# Patient Record
Sex: MALE | Race: WHITE
[De-identification: names, ages, dates, MRNs, and addresses within clinical notes are randomized per-mention and may not be internally consistent; named-entity substitution may affect disease eponyms.]

---

## 2018-01-22 RX ADMIN — CEFAZOLIN SODIUM 1 MLS/HR: 2 SOLUTION INTRAVENOUS at 07:50

## 2018-01-23 ENCOUNTER — HOSPITAL ENCOUNTER (INPATIENT)
Age: 63
LOS: 9 days | Discharge: NURSING, REHAB OR LONG-TERM CARE | DRG: 519 | End: 2018-02-01

## 2018-01-23 ENCOUNTER — HOSPITAL ENCOUNTER (INPATIENT)
Dept: HOSPITAL 91 - REC | Age: 63
LOS: 9 days | Discharge: NURSING, REHAB OR LONG-TERM CARE | DRG: 519 | End: 2018-02-01
Payer: COMMERCIAL

## 2018-01-23 DIAGNOSIS — Z87.891: ICD-10-CM

## 2018-01-23 DIAGNOSIS — M48.061: Primary | ICD-10-CM

## 2018-01-23 DIAGNOSIS — G47.30: ICD-10-CM

## 2018-01-23 DIAGNOSIS — Z79.01: ICD-10-CM

## 2018-01-23 DIAGNOSIS — I10: ICD-10-CM

## 2018-01-23 DIAGNOSIS — R20.8: ICD-10-CM

## 2018-01-23 DIAGNOSIS — Z85.01: ICD-10-CM

## 2018-01-23 DIAGNOSIS — E87.1: ICD-10-CM

## 2018-01-23 DIAGNOSIS — K21.9: ICD-10-CM

## 2018-01-23 DIAGNOSIS — F11.20: ICD-10-CM

## 2018-01-23 DIAGNOSIS — G89.18: ICD-10-CM

## 2018-01-23 DIAGNOSIS — M79.661: ICD-10-CM

## 2018-01-23 DIAGNOSIS — M51.16: ICD-10-CM

## 2018-01-23 DIAGNOSIS — R00.1: ICD-10-CM

## 2018-01-23 DIAGNOSIS — I48.91: ICD-10-CM

## 2018-01-23 DIAGNOSIS — R33.9: ICD-10-CM

## 2018-01-23 DIAGNOSIS — M79.662: ICD-10-CM

## 2018-01-23 DIAGNOSIS — I49.3: ICD-10-CM

## 2018-01-23 DIAGNOSIS — E83.42: ICD-10-CM

## 2018-01-23 LAB
ANION GAP: 13 (ref 8–16)
BLOOD UREA NITROGEN: 24 MG/DL (ref 7–20)
CALCIUM: 9.1 MG/DL (ref 8.4–10.2)
CARBON DIOXIDE: 30 MMOL/L (ref 21–31)
CHLORIDE: 102 MMOL/L (ref 97–110)
CREATININE: 1.1 MG/DL (ref 0.61–1.24)
GLUCOSE: 90 MG/DL (ref 70–220)
HEPATITIS B SURFACE ANTIBODY: NEGATIVE
HEPATITIS B SURFACE ANTIGEN: NEGATIVE
HEPATITIS C VIRAL ANTIBODY: NEGATIVE
HIV 1&2 ANTIBODY: NEGATIVE
POTASSIUM: 4.1 MMOL/L (ref 3.5–5.1)
SODIUM: 141 MMOL/L (ref 135–144)

## 2018-01-23 PROCEDURE — 83540 ASSAY OF IRON: CPT

## 2018-01-23 PROCEDURE — 84443 ASSAY THYROID STIM HORMONE: CPT

## 2018-01-23 PROCEDURE — 88304 TISSUE EXAM BY PATHOLOGIST: CPT

## 2018-01-23 PROCEDURE — 81001 URINALYSIS AUTO W/SCOPE: CPT

## 2018-01-23 PROCEDURE — 93005 ELECTROCARDIOGRAM TRACING: CPT

## 2018-01-23 PROCEDURE — 93970 EXTREMITY STUDY: CPT

## 2018-01-23 PROCEDURE — 83735 ASSAY OF MAGNESIUM: CPT

## 2018-01-23 PROCEDURE — 82040 ASSAY OF SERUM ALBUMIN: CPT

## 2018-01-23 PROCEDURE — 84100 ASSAY OF PHOSPHORUS: CPT

## 2018-01-23 PROCEDURE — 80048 BASIC METABOLIC PNL TOTAL CA: CPT

## 2018-01-23 PROCEDURE — 4A11X4G MONITORING OF PERIPHERAL NERVOUS ELECTRICAL ACTIVITY, INTRAOPERATIVE, EXTERNAL APPROACH: ICD-10-PCS

## 2018-01-23 PROCEDURE — 86803 HEPATITIS C AB TEST: CPT

## 2018-01-23 PROCEDURE — 0SB20ZZ EXCISION OF LUMBAR VERTEBRAL DISC, OPEN APPROACH: ICD-10-PCS

## 2018-01-23 PROCEDURE — 88311 DECALCIFY TISSUE: CPT

## 2018-01-23 PROCEDURE — 86703 HIV-1/HIV-2 1 RESULT ANTBDY: CPT

## 2018-01-23 PROCEDURE — 01NB0ZZ RELEASE LUMBAR NERVE, OPEN APPROACH: ICD-10-PCS

## 2018-01-23 PROCEDURE — 87340 HEPATITIS B SURFACE AG IA: CPT

## 2018-01-23 PROCEDURE — 97164 PT RE-EVAL EST PLAN CARE: CPT

## 2018-01-23 PROCEDURE — 97163 PT EVAL HIGH COMPLEX 45 MIN: CPT

## 2018-01-23 PROCEDURE — 86999 UNLISTED TRANSFUSION MED PX: CPT

## 2018-01-23 PROCEDURE — 97530 THERAPEUTIC ACTIVITIES: CPT

## 2018-01-23 PROCEDURE — 97116 GAIT TRAINING THERAPY: CPT

## 2018-01-23 PROCEDURE — 72131 CT LUMBAR SPINE W/O DYE: CPT

## 2018-01-23 PROCEDURE — 86706 HEP B SURFACE ANTIBODY: CPT

## 2018-01-23 PROCEDURE — 82728 ASSAY OF FERRITIN: CPT

## 2018-01-23 PROCEDURE — 87086 URINE CULTURE/COLONY COUNT: CPT

## 2018-01-23 PROCEDURE — 85025 COMPLETE CBC W/AUTO DIFF WBC: CPT

## 2018-01-23 PROCEDURE — 87081 CULTURE SCREEN ONLY: CPT

## 2018-01-23 PROCEDURE — 72020 X-RAY EXAM OF SPINE 1 VIEW: CPT

## 2018-01-23 RX ADMIN — THROMBIN TOPICAL RECOMBINANT 1 UNITS: KIT at 09:09

## 2018-01-23 RX ADMIN — DEXTROSE, SODIUM CHLORIDE, AND POTASSIUM CHLORIDE 1 MLS/HR: 5; .45; .15 INJECTION INTRAVENOUS at 17:36

## 2018-01-23 RX ADMIN — OXYCODONE HYDROCHLORIDE AND ACETAMINOPHEN 1 TAB: 10; 325 TABLET ORAL at 16:30

## 2018-01-23 RX ADMIN — SODIUM CITRATE AND CITRIC ACID MONOHYDRATE 1 ML: 500; 334 SOLUTION ORAL at 07:18

## 2018-01-23 RX ADMIN — DEXTROSE, SODIUM CHLORIDE, AND POTASSIUM CHLORIDE 1 MLS/HR: 5; .45; .15 INJECTION INTRAVENOUS at 16:31

## 2018-01-23 RX ADMIN — METOPROLOL TARTRATE 1 MG: 50 TABLET, FILM COATED ORAL at 20:51

## 2018-01-23 RX ADMIN — CEFAZOLIN 1 MLS/HR: 1 INJECTION, POWDER, FOR SOLUTION INTRAMUSCULAR; INTRAVENOUS at 22:58

## 2018-01-23 RX ADMIN — METOPROLOL TARTRATE 1 MG: 50 TABLET, FILM COATED ORAL at 15:54

## 2018-01-23 RX ADMIN — DOCUSATE SODIUM 1 MG: 100 CAPSULE, LIQUID FILLED ORAL at 09:00

## 2018-01-23 RX ADMIN — CEFAZOLIN 1 MLS/HR: 1 INJECTION, POWDER, FOR SOLUTION INTRAMUSCULAR; INTRAVENOUS at 16:30

## 2018-01-23 RX ADMIN — DOCUSATE SODIUM 1 MG: 100 CAPSULE, LIQUID FILLED ORAL at 20:50

## 2018-01-23 RX ADMIN — CEFAZOLIN SODIUM 1 MLS/HR: 2 SOLUTION INTRAVENOUS at 07:50

## 2018-01-23 RX ADMIN — OXYCODONE HYDROCHLORIDE AND ACETAMINOPHEN 1 TAB: 10; 325 TABLET ORAL at 22:02

## 2018-01-23 RX ADMIN — FENTANYL CITRATE 1 MCG: 50 INJECTION, SOLUTION INTRAMUSCULAR; INTRAVENOUS at 13:24

## 2018-01-23 RX ADMIN — PYRIDOXINE HYDROCHLORIDE 1 ML: 100 INJECTION, SOLUTION INTRAMUSCULAR; INTRAVENOUS at 07:00

## 2018-01-23 RX ADMIN — CALCIUM CHLORIDE 1 MG: 100 INJECTION INTRAVENOUS; INTRAVENTRICULAR at 09:14

## 2018-01-23 RX ADMIN — CEFAZOLIN 1 MLS/HR: 1 INJECTION, POWDER, FOR SOLUTION INTRAMUSCULAR; INTRAVENOUS at 11:51

## 2018-01-23 RX ADMIN — Medication 1 GM: at 09:11

## 2018-01-23 RX ADMIN — FENTANYL CITRATE 1 MCG: 50 INJECTION, SOLUTION INTRAMUSCULAR; INTRAVENOUS at 13:10

## 2018-01-23 RX ADMIN — BUPIVACAINE HYDROCHLORIDE AND EPINEPHRINE BITARTRATE 1 ML: 5; .005 INJECTION, SOLUTION EPIDURAL; INTRACAUDAL; PERINEURAL at 09:10

## 2018-01-23 RX ADMIN — Medication 1 MG: at 11:33

## 2018-01-23 RX ADMIN — ERGOCALCIFEROL 1 UNIT: 1.25 CAPSULE ORAL at 20:51

## 2018-01-23 RX ADMIN — CEFAZOLIN 1 GM: 1 INJECTION, POWDER, FOR SOLUTION INTRAMUSCULAR; INTRAVENOUS at 09:11

## 2018-01-23 RX ADMIN — BUPIVACAINE HYDROCHLORIDE 1 ML: 2.5 INJECTION, SOLUTION EPIDURAL; INFILTRATION; INTRACAUDAL; PERINEURAL at 10:16

## 2018-01-23 RX ADMIN — Medication 1 MG: at 16:29

## 2018-01-23 RX ADMIN — FENTANYL CITRATE 1 MCG: 50 INJECTION, SOLUTION INTRAMUSCULAR; INTRAVENOUS at 10:14

## 2018-01-23 RX ADMIN — GABAPENTIN 1 MG: 300 CAPSULE ORAL at 20:50

## 2018-01-24 LAB
ABNORMAL IP MESSAGE: 1
ADD MAN DIFF?: NO
ANION GAP: 9 (ref 8–16)
BASOPHIL #: 0 10^3/UL (ref 0–0.1)
BASOPHILS %: 0.2 % (ref 0–2)
BLOOD UREA NITROGEN: 19 MG/DL (ref 7–20)
CALCIUM: 9 MG/DL (ref 8.4–10.2)
CARBON DIOXIDE: 32 MMOL/L (ref 21–31)
CHLORIDE: 102 MMOL/L (ref 97–110)
CREATININE: 0.99 MG/DL (ref 0.61–1.24)
EOSINOPHILS #: 0 10^3/UL (ref 0–0.5)
EOSINOPHILS %: 0.1 % (ref 0–7)
GLUCOSE: 162 MG/DL (ref 70–220)
HEMATOCRIT: 32.6 % (ref 42–52)
HEMOGLOBIN: 9.9 G/DL (ref 14–18)
LYMPHOCYTES #: 1.4 10^3/UL (ref 0.8–2.9)
LYMPHOCYTES %: 14 % (ref 15–51)
MAGNESIUM: 1.7 MG/DL (ref 1.7–2.5)
MEAN CORPUSCULAR HEMOGLOBIN: 21.1 PG (ref 29–33)
MEAN CORPUSCULAR HGB CONC: 30.4 G/DL (ref 32–37)
MEAN CORPUSCULAR VOLUME: 69.4 FL (ref 82–101)
MONOCYTE #: 1.1 10^3/UL (ref 0.3–0.9)
MONOCYTES %: 11.3 % (ref 0–11)
NEUTROPHIL #: 7.3 10^3/UL (ref 1.6–7.5)
NEUTROPHILS %: 74 % (ref 39–77)
NUCLEATED RED BLOOD CELLS #: 0 10^3/UL (ref 0–0)
NUCLEATED RED BLOOD CELLS%: 0 /100WBC (ref 0–0)
PLATELET COUNT: 165 10^3/UL (ref 140–415)
POSITIVE DIFF: (no result)
POTASSIUM: 4.2 MMOL/L (ref 3.5–5.1)
RED BLOOD COUNT: 4.7 10^6/UL (ref 4.7–6.1)
RED CELL DISTRIBUTION WIDTH: 22.7 % (ref 11.5–14.5)
SODIUM: 139 MMOL/L (ref 135–144)
WHITE BLOOD COUNT: 9.8 10^3/UL (ref 4.8–10.8)

## 2018-01-24 RX ADMIN — DOCUSATE SODIUM 1 MG: 100 CAPSULE, LIQUID FILLED ORAL at 09:18

## 2018-01-24 RX ADMIN — Medication 1 MG: at 22:26

## 2018-01-24 RX ADMIN — Medication 1 MG: at 00:30

## 2018-01-24 RX ADMIN — Medication 1 MG: at 17:50

## 2018-01-24 RX ADMIN — OXYCODONE HYDROCHLORIDE AND ACETAMINOPHEN 1 TAB: 10; 325 TABLET ORAL at 09:19

## 2018-01-24 RX ADMIN — PYRIDOXINE HYDROCHLORIDE 1 ML: 100 INJECTION, SOLUTION INTRAMUSCULAR; INTRAVENOUS at 07:00

## 2018-01-24 RX ADMIN — HYDROMORPHONE HYDROCHLORIDE 1 MG: 1 INJECTION, SOLUTION INTRAMUSCULAR; INTRAVENOUS; SUBCUTANEOUS at 05:50

## 2018-01-24 RX ADMIN — DEXTROSE, SODIUM CHLORIDE, AND POTASSIUM CHLORIDE 1 MLS/HR: 5; .45; .15 INJECTION INTRAVENOUS at 16:27

## 2018-01-24 RX ADMIN — AMLODIPINE BESYLATE 1 MG: 2.5 TABLET ORAL at 09:20

## 2018-01-24 RX ADMIN — FENTANYL 1 PATCH: 12 PATCH TRANSDERMAL at 14:11

## 2018-01-24 RX ADMIN — GABAPENTIN 1 MG: 300 CAPSULE ORAL at 20:59

## 2018-01-24 RX ADMIN — DOCUSATE SODIUM 1 MG: 100 CAPSULE, LIQUID FILLED ORAL at 20:59

## 2018-01-24 RX ADMIN — Medication 1 MG: at 12:57

## 2018-01-24 RX ADMIN — CYCLOBENZAPRINE 1 MG: 10 TABLET, FILM COATED ORAL at 20:06

## 2018-01-24 RX ADMIN — AMLODIPINE BESYLATE 1 MG: 2.5 TABLET ORAL at 21:00

## 2018-01-24 RX ADMIN — CYANOCOBALAMIN TAB 500 MCG 1 MCG: 500 TAB at 09:19

## 2018-01-24 RX ADMIN — METOPROLOL TARTRATE 1 MG: 50 TABLET, FILM COATED ORAL at 08:23

## 2018-01-24 RX ADMIN — POLYETHYLENE GLYCOL 3350 1 GM: 17 POWDER, FOR SOLUTION ORAL at 09:15

## 2018-01-24 RX ADMIN — GABAPENTIN 1 MG: 300 CAPSULE ORAL at 09:18

## 2018-01-24 RX ADMIN — DEXTROSE, SODIUM CHLORIDE, AND POTASSIUM CHLORIDE 1 MLS/HR: 5; .45; .15 INJECTION INTRAVENOUS at 03:53

## 2018-01-24 RX ADMIN — METOPROLOL TARTRATE 1 MG: 50 TABLET, FILM COATED ORAL at 21:00

## 2018-01-24 RX ADMIN — DEXTROSE, SODIUM CHLORIDE, AND POTASSIUM CHLORIDE 1 MLS/HR: 5; .45; .15 INJECTION INTRAVENOUS at 12:58

## 2018-01-24 RX ADMIN — OXYCODONE HYDROCHLORIDE AND ACETAMINOPHEN 1 TAB: 10; 325 TABLET ORAL at 02:01

## 2018-01-25 LAB
ABNORMAL IP MESSAGE: 1
ADD MAN DIFF?: NO
ANION GAP: 15 (ref 8–16)
BASOPHIL #: 0 10^3/UL (ref 0–0.1)
BASOPHILS %: 0.2 % (ref 0–2)
BLOOD UREA NITROGEN: 11 MG/DL (ref 7–20)
CALCIUM: 8.7 MG/DL (ref 8.4–10.2)
CARBON DIOXIDE: 30 MMOL/L (ref 21–31)
CHLORIDE: 101 MMOL/L (ref 97–110)
CREATININE: 0.89 MG/DL (ref 0.61–1.24)
EOSINOPHILS #: 0.1 10^3/UL (ref 0–0.5)
EOSINOPHILS %: 0.7 % (ref 0–7)
GLUCOSE: 103 MG/DL (ref 70–220)
HEMATOCRIT: 33.8 % (ref 42–52)
HEMOGLOBIN: 10.2 G/DL (ref 14–18)
LYMPHOCYTES #: 1.7 10^3/UL (ref 0.8–2.9)
LYMPHOCYTES %: 20.3 % (ref 15–51)
MAGNESIUM: 1.6 MG/DL (ref 1.7–2.5)
MEAN CORPUSCULAR HEMOGLOBIN: 21.3 PG (ref 29–33)
MEAN CORPUSCULAR HGB CONC: 30.2 G/DL (ref 32–37)
MEAN CORPUSCULAR VOLUME: 70.4 FL (ref 82–101)
MONOCYTE #: 1.1 10^3/UL (ref 0.3–0.9)
MONOCYTES %: 12.7 % (ref 0–11)
NEUTROPHIL #: 5.6 10^3/UL (ref 1.6–7.5)
NEUTROPHILS %: 66 % (ref 39–77)
NUCLEATED RED BLOOD CELLS #: 0 10^3/UL (ref 0–0)
NUCLEATED RED BLOOD CELLS%: 0 /100WBC (ref 0–0)
PHOSPHORUS: 4.6 MG/DL (ref 2.5–4.9)
PLATELET COUNT: 153 10^3/UL (ref 140–415)
POSITIVE DIFF: (no result)
POTASSIUM: 4.6 MMOL/L (ref 3.5–5.1)
RED BLOOD COUNT: 4.8 10^6/UL (ref 4.7–6.1)
RED CELL DISTRIBUTION WIDTH: 23.3 % (ref 11.5–14.5)
SODIUM: 141 MMOL/L (ref 135–144)
WHITE BLOOD COUNT: 8.4 10^3/UL (ref 4.8–10.8)

## 2018-01-25 RX ADMIN — MAGNESIUM SULFATE HEPTAHYDRATE 1 MLS/HR: 500 INJECTION, SOLUTION INTRAMUSCULAR; INTRAVENOUS at 10:07

## 2018-01-25 RX ADMIN — Medication 1 MG: at 08:26

## 2018-01-25 RX ADMIN — DOCUSATE SODIUM 1 MG: 100 CAPSULE, LIQUID FILLED ORAL at 20:46

## 2018-01-25 RX ADMIN — GABAPENTIN 1 MG: 300 CAPSULE ORAL at 10:09

## 2018-01-25 RX ADMIN — CYANOCOBALAMIN TAB 500 MCG 1 MCG: 500 TAB at 10:09

## 2018-01-25 RX ADMIN — PYRIDOXINE HYDROCHLORIDE 1 ML: 100 INJECTION, SOLUTION INTRAMUSCULAR; INTRAVENOUS at 07:00

## 2018-01-25 RX ADMIN — DEXTROSE, SODIUM CHLORIDE, AND POTASSIUM CHLORIDE 1 MLS/HR: 5; .45; .15 INJECTION INTRAVENOUS at 18:55

## 2018-01-25 RX ADMIN — DEXTROSE, SODIUM CHLORIDE, AND POTASSIUM CHLORIDE 1 MLS/HR: 5; .45; .15 INJECTION INTRAVENOUS at 03:16

## 2018-01-25 RX ADMIN — CYCLOBENZAPRINE 1 MG: 10 TABLET, FILM COATED ORAL at 17:17

## 2018-01-25 RX ADMIN — GABAPENTIN 1 MG: 300 CAPSULE ORAL at 20:45

## 2018-01-25 RX ADMIN — Medication 1 MG: at 16:24

## 2018-01-25 RX ADMIN — DOCUSATE SODIUM 1 MG: 100 CAPSULE, LIQUID FILLED ORAL at 10:09

## 2018-01-25 RX ADMIN — POLYETHYLENE GLYCOL 3350 1 GM: 17 POWDER, FOR SOLUTION ORAL at 10:11

## 2018-01-25 RX ADMIN — METOPROLOL TARTRATE 1 MG: 50 TABLET, FILM COATED ORAL at 20:46

## 2018-01-25 RX ADMIN — HYDROMORPHONE HYDROCHLORIDE 1 MG: 1 INJECTION, SOLUTION INTRAMUSCULAR; INTRAVENOUS; SUBCUTANEOUS at 08:16

## 2018-01-25 RX ADMIN — CYCLOBENZAPRINE 1 MG: 10 TABLET, FILM COATED ORAL at 08:15

## 2018-01-25 RX ADMIN — METOPROLOL TARTRATE 1 MG: 50 TABLET, FILM COATED ORAL at 10:11

## 2018-01-25 RX ADMIN — AMLODIPINE BESYLATE 1 MG: 2.5 TABLET ORAL at 20:45

## 2018-01-25 RX ADMIN — Medication 1 MG: at 22:30

## 2018-01-25 RX ADMIN — AMLODIPINE BESYLATE 1 MG: 2.5 TABLET ORAL at 10:11

## 2018-01-26 LAB
% IRON SATURATION: 6 % SAT (ref 22–52)
ABNORMAL IP MESSAGE: 1
ADD MAN DIFF?: NO
ANION GAP: 14 (ref 8–16)
BASOPHIL #: 0 10^3/UL (ref 0–0.1)
BASOPHILS %: 0.2 % (ref 0–2)
BLOOD UREA NITROGEN: 11 MG/DL (ref 7–20)
CALCIUM: 9.7 MG/DL (ref 8.4–10.2)
CARBON DIOXIDE: 29 MMOL/L (ref 21–31)
CHLORIDE: 96 MMOL/L (ref 97–110)
CREATININE: 0.84 MG/DL (ref 0.61–1.24)
EOSINOPHILS #: 0.1 10^3/UL (ref 0–0.5)
EOSINOPHILS %: 0.8 % (ref 0–7)
FERRITIN: 54.8 NG/ML (ref 11.1–264)
GLUCOSE: 117 MG/DL (ref 70–220)
HEMATOCRIT: 35.3 % (ref 42–52)
HEMOGLOBIN: 11 G/DL (ref 14–18)
IRON: 23 UG/DL (ref 35–150)
LYMPHOCYTES #: 1.6 10^3/UL (ref 0.8–2.9)
LYMPHOCYTES %: 15.2 % (ref 15–51)
MAGNESIUM: 1.8 MG/DL (ref 1.7–2.5)
MEAN CORPUSCULAR HEMOGLOBIN: 21.4 PG (ref 29–33)
MEAN CORPUSCULAR HGB CONC: 31.2 G/DL (ref 32–37)
MEAN CORPUSCULAR VOLUME: 68.5 FL (ref 82–101)
MONOCYTE #: 1.3 10^3/UL (ref 0.3–0.9)
MONOCYTES %: 12.9 % (ref 0–11)
NEUTROPHIL #: 7.4 10^3/UL (ref 1.6–7.5)
NEUTROPHILS %: 70.4 % (ref 39–77)
NUCLEATED RED BLOOD CELLS #: 0 10^3/UL (ref 0–0)
NUCLEATED RED BLOOD CELLS%: 0 /100WBC (ref 0–0)
PHOSPHORUS: 3.8 MG/DL (ref 2.5–4.9)
PLATELET COUNT: 161 10^3/UL (ref 140–415)
POSITIVE DIFF: (no result)
POTASSIUM: 4.2 MMOL/L (ref 3.5–5.1)
RED BLOOD COUNT: 5.15 10^6/UL (ref 4.7–6.1)
RED CELL DISTRIBUTION WIDTH: 22.9 % (ref 11.5–14.5)
SODIUM: 135 MMOL/L (ref 135–144)
TOTAL IRON BINDING CAPACITY: 373 UG/DL (ref 241–421)
WHITE BLOOD COUNT: 10.4 10^3/UL (ref 4.8–10.8)

## 2018-01-26 RX ADMIN — LORAZEPAM 1 MG: 2 INJECTION, SOLUTION INTRAMUSCULAR; INTRAVENOUS at 16:00

## 2018-01-26 RX ADMIN — Medication 1 MG: at 09:39

## 2018-01-26 RX ADMIN — OXYCODONE HYDROCHLORIDE 1 MG: 20 TABLET, FILM COATED, EXTENDED RELEASE ORAL at 15:00

## 2018-01-26 RX ADMIN — DOCUSATE SODIUM 1 MG: 100 CAPSULE, LIQUID FILLED ORAL at 08:51

## 2018-01-26 RX ADMIN — Medication 1 MG: at 04:59

## 2018-01-26 RX ADMIN — METOPROLOL TARTRATE 1 MG: 50 TABLET, FILM COATED ORAL at 20:30

## 2018-01-26 RX ADMIN — Medication 1 MG: at 15:09

## 2018-01-26 RX ADMIN — THIAMINE HYDROCHLORIDE 1 MLS/HR: 100 INJECTION, SOLUTION INTRAMUSCULAR; INTRAVENOUS at 15:00

## 2018-01-26 RX ADMIN — METOPROLOL TARTRATE 1 MG: 50 TABLET, FILM COATED ORAL at 08:43

## 2018-01-26 RX ADMIN — AMLODIPINE BESYLATE 1 MG: 2.5 TABLET ORAL at 08:45

## 2018-01-26 RX ADMIN — GABAPENTIN 1 MG: 400 CAPSULE ORAL at 20:29

## 2018-01-26 RX ADMIN — DOCUSATE SODIUM 1 MG: 100 CAPSULE, LIQUID FILLED ORAL at 20:30

## 2018-01-26 RX ADMIN — GABAPENTIN 1 MG: 400 CAPSULE ORAL at 12:34

## 2018-01-26 RX ADMIN — AMLODIPINE BESYLATE 1 MG: 2.5 TABLET ORAL at 20:30

## 2018-01-26 RX ADMIN — SODIUM FERRIC GLUCONATE COMPLEX 1 MLS/HR: 12.5 INJECTION INTRAVENOUS at 14:59

## 2018-01-26 RX ADMIN — OXYCODONE HYDROCHLORIDE 1 MG: 20 TABLET, FILM COATED, EXTENDED RELEASE ORAL at 21:26

## 2018-01-26 RX ADMIN — CYANOCOBALAMIN TAB 500 MCG 1 MCG: 500 TAB at 08:51

## 2018-01-26 RX ADMIN — PYRIDOXINE HYDROCHLORIDE 1 ML: 100 INJECTION, SOLUTION INTRAMUSCULAR; INTRAVENOUS at 07:00

## 2018-01-26 RX ADMIN — POLYETHYLENE GLYCOL 3350 1 GM: 17 POWDER, FOR SOLUTION ORAL at 08:51

## 2018-01-26 RX ADMIN — DEXTROSE, SODIUM CHLORIDE, AND POTASSIUM CHLORIDE 1 MLS/HR: 5; .45; .15 INJECTION INTRAVENOUS at 04:55

## 2018-01-26 RX ADMIN — GABAPENTIN 1 MG: 300 CAPSULE ORAL at 08:51

## 2018-01-27 LAB
ABNORMAL IP MESSAGE: 1
ADD MAN DIFF?: NO
ADD UMIC: YES
ANION GAP: 12 (ref 8–16)
BASOPHIL #: 0 10^3/UL (ref 0–0.1)
BASOPHILS %: 0.2 % (ref 0–2)
BLOOD UREA NITROGEN: 16 MG/DL (ref 7–20)
CALCIUM: 9.4 MG/DL (ref 8.4–10.2)
CARBON DIOXIDE: 31 MMOL/L (ref 21–31)
CHLORIDE: 95 MMOL/L (ref 97–110)
CREATININE: 1.02 MG/DL (ref 0.61–1.24)
EOSINOPHILS #: 0.2 10^3/UL (ref 0–0.5)
EOSINOPHILS %: 1.7 % (ref 0–7)
GLUCOSE: 109 MG/DL (ref 70–220)
HEMATOCRIT: 35.2 % (ref 42–52)
HEMOGLOBIN: 10.7 G/DL (ref 14–18)
LYMPHOCYTES #: 1.4 10^3/UL (ref 0.8–2.9)
LYMPHOCYTES %: 16.1 % (ref 15–51)
MAGNESIUM: 1.7 MG/DL (ref 1.7–2.5)
MEAN CORPUSCULAR HEMOGLOBIN: 21.1 PG (ref 29–33)
MEAN CORPUSCULAR HGB CONC: 30.4 G/DL (ref 32–37)
MEAN CORPUSCULAR VOLUME: 69.4 FL (ref 82–101)
MONOCYTE #: 1 10^3/UL (ref 0.3–0.9)
MONOCYTES %: 11.1 % (ref 0–11)
NEUTROPHIL #: 6.1 10^3/UL (ref 1.6–7.5)
NEUTROPHILS %: 70.6 % (ref 39–77)
NUCLEATED RED BLOOD CELLS #: 0 10^3/UL (ref 0–0)
NUCLEATED RED BLOOD CELLS%: 0 /100WBC (ref 0–0)
PHOSPHORUS: 4.5 MG/DL (ref 2.5–4.9)
PLATELET COUNT: 161 10^3/UL (ref 140–415)
POSITIVE DIFF: (no result)
POTASSIUM: 4.3 MMOL/L (ref 3.5–5.1)
RED BLOOD COUNT: 5.07 10^6/UL (ref 4.7–6.1)
RED CELL DISTRIBUTION WIDTH: 22.9 % (ref 11.5–14.5)
SODIUM: 134 MMOL/L (ref 135–144)
UR ASCORBIC ACID: NEGATIVE MG/DL
UR BILIRUBIN (DIP): NEGATIVE MG/DL
UR BLOOD (DIP): (no result) MG/DL
UR CLARITY: CLEAR
UR COLOR: YELLOW
UR GLUCOSE (DIP): NEGATIVE MG/DL
UR KETONES (DIP): NEGATIVE MG/DL
UR LEUKOCYTE ESTERASE (DIP): NEGATIVE LEU/UL
UR NITRITE (DIP): NEGATIVE MG/DL
UR PH (DIP): 5 (ref 5–9)
UR RBC: 4 /HPF (ref 0–5)
UR SPECIFIC GRAVITY (DIP): 1.01 (ref 1–1.03)
UR TOTAL PROTEIN (DIP): NEGATIVE MG/DL
UR UROBILINOGEN (DIP): NEGATIVE MG/DL
UR WBC: 1 /HPF (ref 0–5)
WHITE BLOOD COUNT: 8.6 10^3/UL (ref 4.8–10.8)

## 2018-01-27 RX ADMIN — THIAMINE HYDROCHLORIDE 1 MLS/HR: 100 INJECTION, SOLUTION INTRAMUSCULAR; INTRAVENOUS at 05:40

## 2018-01-27 RX ADMIN — Medication 1 MG: at 02:34

## 2018-01-27 RX ADMIN — METOPROLOL TARTRATE 1 MG: 50 TABLET, FILM COATED ORAL at 21:16

## 2018-01-27 RX ADMIN — AMLODIPINE BESYLATE 1 MG: 2.5 TABLET ORAL at 21:00

## 2018-01-27 RX ADMIN — Medication 1 MG: at 09:04

## 2018-01-27 RX ADMIN — OXYCODONE HYDROCHLORIDE 1 MG: 40 TABLET, FILM COATED, EXTENDED RELEASE ORAL at 15:46

## 2018-01-27 RX ADMIN — DOCUSATE SODIUM 1 MG: 100 CAPSULE, LIQUID FILLED ORAL at 08:15

## 2018-01-27 RX ADMIN — SODIUM FERRIC GLUCONATE COMPLEX 1 MLS/HR: 12.5 INJECTION INTRAVENOUS at 15:47

## 2018-01-27 RX ADMIN — GABAPENTIN 1 MG: 400 CAPSULE ORAL at 08:15

## 2018-01-27 RX ADMIN — OXYCODONE HYDROCHLORIDE 1 MG: 20 TABLET, FILM COATED, EXTENDED RELEASE ORAL at 05:44

## 2018-01-27 RX ADMIN — KETOROLAC TROMETHAMINE 1 MG: 30 INJECTION, SOLUTION INTRAMUSCULAR at 11:58

## 2018-01-27 RX ADMIN — PYRIDOXINE HYDROCHLORIDE 1 ML: 100 INJECTION, SOLUTION INTRAMUSCULAR; INTRAVENOUS at 07:00

## 2018-01-27 RX ADMIN — OXYCODONE HYDROCHLORIDE 1 MG: 40 TABLET, FILM COATED, EXTENDED RELEASE ORAL at 22:30

## 2018-01-27 RX ADMIN — Medication 1 MG: at 15:55

## 2018-01-27 RX ADMIN — CYANOCOBALAMIN TAB 500 MCG 1 MCG: 500 TAB at 08:19

## 2018-01-27 RX ADMIN — THIAMINE HYDROCHLORIDE 1 MLS/HR: 100 INJECTION, SOLUTION INTRAMUSCULAR; INTRAVENOUS at 22:30

## 2018-01-27 RX ADMIN — THIAMINE HYDROCHLORIDE 1 MLS/HR: 100 INJECTION, SOLUTION INTRAMUSCULAR; INTRAVENOUS at 03:08

## 2018-01-27 RX ADMIN — METOPROLOL TARTRATE 1 MG: 50 TABLET, FILM COATED ORAL at 08:16

## 2018-01-27 RX ADMIN — DOCUSATE SODIUM 1 MG: 100 CAPSULE, LIQUID FILLED ORAL at 21:16

## 2018-01-27 RX ADMIN — GABAPENTIN 1 MG: 400 CAPSULE ORAL at 21:16

## 2018-01-27 RX ADMIN — GABAPENTIN 1 MG: 400 CAPSULE ORAL at 13:38

## 2018-01-27 RX ADMIN — AMLODIPINE BESYLATE 1 MG: 2.5 TABLET ORAL at 08:16

## 2018-01-27 RX ADMIN — POLYETHYLENE GLYCOL 3350 1 GM: 17 POWDER, FOR SOLUTION ORAL at 08:19

## 2018-01-28 RX ADMIN — Medication 1 MG: at 10:48

## 2018-01-28 RX ADMIN — CYANOCOBALAMIN TAB 500 MCG 1 MCG: 500 TAB at 08:18

## 2018-01-28 RX ADMIN — Medication 1 MG: at 07:18

## 2018-01-28 RX ADMIN — ALUMINUM HYDROXIDE, MAGNESIUM HYDROXIDE, AND SIMETHICONE 1 ML: 200; 200; 20 SUSPENSION ORAL at 00:06

## 2018-01-28 RX ADMIN — OXYCODONE HYDROCHLORIDE 1 MG: 40 TABLET, FILM COATED, EXTENDED RELEASE ORAL at 06:27

## 2018-01-28 RX ADMIN — PYRIDOXINE HYDROCHLORIDE 1 ML: 100 INJECTION, SOLUTION INTRAMUSCULAR; INTRAVENOUS at 03:18

## 2018-01-28 RX ADMIN — GABAPENTIN 1 MG: 400 CAPSULE ORAL at 21:27

## 2018-01-28 RX ADMIN — GABAPENTIN 1 MG: 400 CAPSULE ORAL at 08:17

## 2018-01-28 RX ADMIN — DOCUSATE SODIUM 1 MG: 100 CAPSULE, LIQUID FILLED ORAL at 21:26

## 2018-01-28 RX ADMIN — METOPROLOL TARTRATE 1 MG: 50 TABLET, FILM COATED ORAL at 21:27

## 2018-01-28 RX ADMIN — OXYCODONE HYDROCHLORIDE 1 MG: 40 TABLET, FILM COATED, EXTENDED RELEASE ORAL at 23:08

## 2018-01-28 RX ADMIN — AMLODIPINE BESYLATE 1 MG: 2.5 TABLET ORAL at 08:01

## 2018-01-28 RX ADMIN — THIAMINE HYDROCHLORIDE 1 MLS/HR: 100 INJECTION, SOLUTION INTRAMUSCULAR; INTRAVENOUS at 15:03

## 2018-01-28 RX ADMIN — AMLODIPINE BESYLATE 1 MG: 2.5 TABLET ORAL at 21:00

## 2018-01-28 RX ADMIN — DOCUSATE SODIUM 1 MG: 100 CAPSULE, LIQUID FILLED ORAL at 08:17

## 2018-01-28 RX ADMIN — GABAPENTIN 1 MG: 400 CAPSULE ORAL at 12:24

## 2018-01-28 RX ADMIN — SODIUM FERRIC GLUCONATE COMPLEX 1 MLS/HR: 12.5 INJECTION INTRAVENOUS at 15:36

## 2018-01-28 RX ADMIN — OXYCODONE HYDROCHLORIDE 1 MG: 40 TABLET, FILM COATED, EXTENDED RELEASE ORAL at 15:03

## 2018-01-28 RX ADMIN — METOPROLOL TARTRATE 1 MG: 50 TABLET, FILM COATED ORAL at 08:18

## 2018-01-28 RX ADMIN — POLYETHYLENE GLYCOL 3350 1 GM: 17 POWDER, FOR SOLUTION ORAL at 08:17

## 2018-01-29 LAB
ABNORMAL IP MESSAGE: 1
ADD MAN DIFF?: NO
ALBUMIN: 3.3 G/DL (ref 3.3–4.9)
BASOPHIL #: 0 10^3/UL (ref 0–0.1)
BASOPHILS %: 0.3 % (ref 0–2)
EOSINOPHILS #: 0.2 10^3/UL (ref 0–0.5)
EOSINOPHILS %: 2.3 % (ref 0–7)
HEMATOCRIT: 34.2 % (ref 42–52)
HEMOGLOBIN: 10.1 G/DL (ref 14–18)
LYMPHOCYTES #: 1.4 10^3/UL (ref 0.8–2.9)
LYMPHOCYTES %: 21.5 % (ref 15–51)
MAGNESIUM: 1.8 MG/DL (ref 1.7–2.5)
MEAN CORPUSCULAR HEMOGLOBIN: 21.3 PG (ref 29–33)
MEAN CORPUSCULAR HGB CONC: 29.5 G/DL (ref 32–37)
MEAN CORPUSCULAR VOLUME: 72.2 FL (ref 82–101)
MONOCYTE #: 0.6 10^3/UL (ref 0.3–0.9)
MONOCYTES %: 9.8 % (ref 0–11)
NEUTROPHIL #: 4.3 10^3/UL (ref 1.6–7.5)
NEUTROPHILS %: 65.8 % (ref 39–77)
NUCLEATED RED BLOOD CELLS #: 0 10^3/UL (ref 0–0)
NUCLEATED RED BLOOD CELLS%: 0 /100WBC (ref 0–0)
PHOSPHORUS: 4.5 MG/DL (ref 2.5–4.9)
PLATELET COUNT: 154 10^3/UL (ref 140–415)
POSITIVE DIFF: (no result)
RED BLOOD COUNT: 4.74 10^6/UL (ref 4.7–6.1)
RED CELL DISTRIBUTION WIDTH: 22.4 % (ref 11.5–14.5)
WHITE BLOOD COUNT: 6.6 10^3/UL (ref 4.8–10.8)

## 2018-01-29 RX ADMIN — GABAPENTIN 1 MG: 400 CAPSULE ORAL at 18:00

## 2018-01-29 RX ADMIN — PYRIDOXINE HYDROCHLORIDE 1 ML: 100 INJECTION, SOLUTION INTRAMUSCULAR; INTRAVENOUS at 06:50

## 2018-01-29 RX ADMIN — CYCLOBENZAPRINE 1 MG: 10 TABLET, FILM COATED ORAL at 21:28

## 2018-01-29 RX ADMIN — TAMSULOSIN HYDROCHLORIDE 1 MG: 0.4 CAPSULE ORAL at 21:28

## 2018-01-29 RX ADMIN — METOPROLOL TARTRATE 1 MG: 50 TABLET, FILM COATED ORAL at 21:00

## 2018-01-29 RX ADMIN — Medication 1 MG: at 07:47

## 2018-01-29 RX ADMIN — DOCUSATE SODIUM 1 MG: 100 CAPSULE, LIQUID FILLED ORAL at 09:46

## 2018-01-29 RX ADMIN — SODIUM FERRIC GLUCONATE COMPLEX 1 MLS/HR: 12.5 INJECTION INTRAVENOUS at 15:42

## 2018-01-29 RX ADMIN — OXYCODONE HYDROCHLORIDE 1 MG: 40 TABLET, FILM COATED, EXTENDED RELEASE ORAL at 07:00

## 2018-01-29 RX ADMIN — THIAMINE HYDROCHLORIDE 1 MLS/HR: 100 INJECTION, SOLUTION INTRAMUSCULAR; INTRAVENOUS at 07:49

## 2018-01-29 RX ADMIN — DOCUSATE SODIUM 1 MG: 100 CAPSULE, LIQUID FILLED ORAL at 21:27

## 2018-01-29 RX ADMIN — GABAPENTIN 1 MG: 400 CAPSULE ORAL at 09:46

## 2018-01-29 RX ADMIN — CYANOCOBALAMIN TAB 500 MCG 1 MCG: 500 TAB at 09:46

## 2018-01-29 RX ADMIN — AMLODIPINE BESYLATE 1 MG: 2.5 TABLET ORAL at 21:33

## 2018-01-29 RX ADMIN — METOPROLOL TARTRATE 1 MG: 50 TABLET, FILM COATED ORAL at 09:48

## 2018-01-29 RX ADMIN — POLYETHYLENE GLYCOL 3350 1 GM: 17 POWDER, FOR SOLUTION ORAL at 09:46

## 2018-01-29 RX ADMIN — AMLODIPINE BESYLATE 1 MG: 2.5 TABLET ORAL at 09:48

## 2018-01-29 RX ADMIN — Medication 1 MG: at 00:18

## 2018-01-29 RX ADMIN — GABAPENTIN 1 MG: 400 CAPSULE ORAL at 15:43

## 2018-01-29 RX ADMIN — OXYCODONE HYDROCHLORIDE 1 MG: 40 TABLET, FILM COATED, EXTENDED RELEASE ORAL at 15:42

## 2018-01-29 RX ADMIN — CYCLOBENZAPRINE 1 MG: 10 TABLET, FILM COATED ORAL at 09:51

## 2018-01-30 LAB
ABNORMAL IP MESSAGE: 1
ADD MAN DIFF?: NO
ADD UMIC: YES
ANION GAP: 13 (ref 8–16)
BASOPHIL #: 0 10^3/UL (ref 0–0.1)
BASOPHILS %: 0.6 % (ref 0–2)
BLOOD UREA NITROGEN: 14 MG/DL (ref 7–20)
CALCIUM: 9.4 MG/DL (ref 8.4–10.2)
CARBON DIOXIDE: 32 MMOL/L (ref 21–31)
CHLORIDE: 95 MMOL/L (ref 97–110)
CREATININE: 0.92 MG/DL (ref 0.61–1.24)
EOSINOPHILS #: 0.2 10^3/UL (ref 0–0.5)
EOSINOPHILS %: 2.6 % (ref 0–7)
GLUCOSE: 111 MG/DL (ref 70–220)
HEMATOCRIT: 33 % (ref 42–52)
HEMOGLOBIN: 10.1 G/DL (ref 14–18)
LYMPHOCYTES #: 1.4 10^3/UL (ref 0.8–2.9)
LYMPHOCYTES %: 20.8 % (ref 15–51)
MAGNESIUM: 1.8 MG/DL (ref 1.7–2.5)
MEAN CORPUSCULAR HEMOGLOBIN: 21.8 PG (ref 29–33)
MEAN CORPUSCULAR HGB CONC: 30.6 G/DL (ref 32–37)
MEAN CORPUSCULAR VOLUME: 71.1 FL (ref 82–101)
MONOCYTE #: 0.7 10^3/UL (ref 0.3–0.9)
MONOCYTES %: 9.8 % (ref 0–11)
NEUTROPHIL #: 4.4 10^3/UL (ref 1.6–7.5)
NEUTROPHILS %: 66 % (ref 39–77)
NUCLEATED RED BLOOD CELLS #: 0 10^3/UL (ref 0–0)
NUCLEATED RED BLOOD CELLS%: 0 /100WBC (ref 0–0)
PHOSPHORUS: 4.5 MG/DL (ref 2.5–4.9)
PLATELET COUNT: 161 10^3/UL (ref 140–415)
POSITIVE DIFF: (no result)
POTASSIUM: 4.3 MMOL/L (ref 3.5–5.1)
RED BLOOD COUNT: 4.64 10^6/UL (ref 4.7–6.1)
RED CELL DISTRIBUTION WIDTH: 23.1 % (ref 11.5–14.5)
SODIUM: 136 MMOL/L (ref 135–144)
THYROID STIMULATING HORMONE: 1.05 MIU/L (ref 0.47–4.68)
UR ASCORBIC ACID: NEGATIVE MG/DL
UR BACTERIA: (no result) /HPF
UR BILIRUBIN (DIP): NEGATIVE MG/DL
UR BLOOD (DIP): (no result) MG/DL
UR CLARITY: CLEAR
UR COLOR: YELLOW
UR GLUCOSE (DIP): NEGATIVE MG/DL
UR KETONES (DIP): NEGATIVE MG/DL
UR LEUKOCYTE ESTERASE (DIP): (no result) LEU/UL
UR NITRITE (DIP): NEGATIVE MG/DL
UR PH (DIP): 7 (ref 5–9)
UR RBC: 1 /HPF (ref 0–5)
UR SPECIFIC GRAVITY (DIP): 1 (ref 1–1.03)
UR TOTAL PROTEIN (DIP): (no result) MG/DL
UR UROBILINOGEN (DIP): NEGATIVE MG/DL
UR WBC: 8 /HPF (ref 0–5)
WHITE BLOOD COUNT: 6.6 10^3/UL (ref 4.8–10.8)

## 2018-01-30 RX ADMIN — GABAPENTIN 1 MG: 400 CAPSULE ORAL at 17:25

## 2018-01-30 RX ADMIN — GABAPENTIN 1 MG: 400 CAPSULE ORAL at 23:52

## 2018-01-30 RX ADMIN — METOPROLOL TARTRATE 1 MG: 50 TABLET, FILM COATED ORAL at 08:22

## 2018-01-30 RX ADMIN — Medication 1 MG: at 05:49

## 2018-01-30 RX ADMIN — APIXABAN 1 MG: 5 TABLET, FILM COATED ORAL at 11:00

## 2018-01-30 RX ADMIN — DOCUSATE SODIUM 1 MG: 100 CAPSULE, LIQUID FILLED ORAL at 08:34

## 2018-01-30 RX ADMIN — GABAPENTIN 1 MG: 400 CAPSULE ORAL at 12:14

## 2018-01-30 RX ADMIN — THIAMINE HYDROCHLORIDE 1 MLS/HR: 100 INJECTION, SOLUTION INTRAMUSCULAR; INTRAVENOUS at 02:41

## 2018-01-30 RX ADMIN — GABAPENTIN 1 MG: 400 CAPSULE ORAL at 05:42

## 2018-01-30 RX ADMIN — OXYCODONE HYDROCHLORIDE 1 MG: 20 TABLET, FILM COATED, EXTENDED RELEASE ORAL at 13:34

## 2018-01-30 RX ADMIN — CYANOCOBALAMIN TAB 500 MCG 1 MCG: 500 TAB at 08:34

## 2018-01-30 RX ADMIN — AMLODIPINE BESYLATE 1 MG: 2.5 TABLET ORAL at 21:21

## 2018-01-30 RX ADMIN — ERGOCALCIFEROL 1 UNIT: 1.25 CAPSULE ORAL at 21:20

## 2018-01-30 RX ADMIN — AMLODIPINE BESYLATE 1 MG: 2.5 TABLET ORAL at 08:23

## 2018-01-30 RX ADMIN — SODIUM FERRIC GLUCONATE COMPLEX 1 MLS/HR: 12.5 INJECTION INTRAVENOUS at 15:41

## 2018-01-30 RX ADMIN — POLYETHYLENE GLYCOL 3350 1 GM: 17 POWDER, FOR SOLUTION ORAL at 08:33

## 2018-01-30 RX ADMIN — DOCUSATE SODIUM 1 MG: 100 CAPSULE, LIQUID FILLED ORAL at 21:20

## 2018-01-30 RX ADMIN — TAMSULOSIN HYDROCHLORIDE 1 MG: 0.4 CAPSULE ORAL at 21:20

## 2018-01-30 RX ADMIN — GABAPENTIN 1 MG: 400 CAPSULE ORAL at 01:28

## 2018-01-30 RX ADMIN — APIXABAN 1 MG: 5 TABLET, FILM COATED ORAL at 21:21

## 2018-01-30 RX ADMIN — CYCLOBENZAPRINE 1 MG: 10 TABLET, FILM COATED ORAL at 21:20

## 2018-01-30 RX ADMIN — OXYCODONE HYDROCHLORIDE 1 MG: 20 TABLET, FILM COATED, EXTENDED RELEASE ORAL at 21:20

## 2018-01-30 RX ADMIN — CYCLOBENZAPRINE 1 MG: 10 TABLET, FILM COATED ORAL at 08:34

## 2018-01-31 RX ADMIN — TAMSULOSIN HYDROCHLORIDE 1 MG: 0.4 CAPSULE ORAL at 20:35

## 2018-01-31 RX ADMIN — METOPROLOL TARTRATE 1 MG: 25 TABLET ORAL at 20:37

## 2018-01-31 RX ADMIN — HYDROMORPHONE HYDROCHLORIDE 1 MG: 1 INJECTION, SOLUTION INTRAMUSCULAR; INTRAVENOUS; SUBCUTANEOUS at 17:13

## 2018-01-31 RX ADMIN — HYDROMORPHONE HYDROCHLORIDE 1 MG: 1 INJECTION, SOLUTION INTRAMUSCULAR; INTRAVENOUS; SUBCUTANEOUS at 23:48

## 2018-01-31 RX ADMIN — AMLODIPINE BESYLATE 1 MG: 2.5 TABLET ORAL at 20:36

## 2018-01-31 RX ADMIN — AMLODIPINE BESYLATE 1 MG: 2.5 TABLET ORAL at 09:00

## 2018-01-31 RX ADMIN — OXYCODONE HYDROCHLORIDE 1 MG: 20 TABLET, FILM COATED, EXTENDED RELEASE ORAL at 04:08

## 2018-01-31 RX ADMIN — GABAPENTIN 1 MG: 400 CAPSULE ORAL at 23:48

## 2018-01-31 RX ADMIN — GABAPENTIN 1 MG: 400 CAPSULE ORAL at 17:09

## 2018-01-31 RX ADMIN — POLYETHYLENE GLYCOL 3350 1 GM: 17 POWDER, FOR SOLUTION ORAL at 09:33

## 2018-01-31 RX ADMIN — HYDROMORPHONE HYDROCHLORIDE 1 MG: 1 INJECTION, SOLUTION INTRAMUSCULAR; INTRAVENOUS; SUBCUTANEOUS at 10:10

## 2018-01-31 RX ADMIN — GABAPENTIN 1 MG: 400 CAPSULE ORAL at 06:02

## 2018-01-31 RX ADMIN — GABAPENTIN 1 MG: 400 CAPSULE ORAL at 12:06

## 2018-01-31 RX ADMIN — OXYCODONE HYDROCHLORIDE 1 MG: 20 TABLET, FILM COATED, EXTENDED RELEASE ORAL at 20:34

## 2018-01-31 RX ADMIN — BENZOCAINE, MENTHOL 1 LOZENGE: 15; 3.6 LOZENGE ORAL at 18:37

## 2018-01-31 RX ADMIN — CYANOCOBALAMIN TAB 500 MCG 1 MCG: 500 TAB at 09:33

## 2018-01-31 RX ADMIN — DOCUSATE SODIUM 1 MG: 100 CAPSULE, LIQUID FILLED ORAL at 09:33

## 2018-01-31 RX ADMIN — CYCLOBENZAPRINE 1 MG: 10 TABLET, FILM COATED ORAL at 20:35

## 2018-01-31 RX ADMIN — APIXABAN 1 MG: 5 TABLET, FILM COATED ORAL at 09:33

## 2018-01-31 RX ADMIN — BENZOCAINE, MENTHOL 1 LOZENGE: 15; 3.6 LOZENGE ORAL at 04:55

## 2018-01-31 RX ADMIN — OXYCODONE HYDROCHLORIDE 1 MG: 20 TABLET, FILM COATED, EXTENDED RELEASE ORAL at 12:07

## 2018-01-31 RX ADMIN — APIXABAN 1 MG: 5 TABLET, FILM COATED ORAL at 20:34

## 2018-01-31 RX ADMIN — METOPROLOL TARTRATE 1 MG: 25 TABLET ORAL at 13:57

## 2018-01-31 RX ADMIN — DOCUSATE SODIUM 1 MG: 100 CAPSULE, LIQUID FILLED ORAL at 20:34

## 2018-01-31 RX ADMIN — CYCLOBENZAPRINE 1 MG: 10 TABLET, FILM COATED ORAL at 09:33

## 2018-02-01 ENCOUNTER — HOSPITAL ENCOUNTER (INPATIENT)
Age: 63
LOS: 13 days | Discharge: HOME HEALTH | DRG: 560 | End: 2018-02-14

## 2018-02-01 ENCOUNTER — HOSPITAL ENCOUNTER (INPATIENT)
Dept: HOSPITAL 91 - VRC | Age: 63
LOS: 13 days | Discharge: HOME HEALTH | DRG: 560 | End: 2018-02-14
Payer: COMMERCIAL

## 2018-02-01 DIAGNOSIS — I48.91: ICD-10-CM

## 2018-02-01 DIAGNOSIS — I80.8: ICD-10-CM

## 2018-02-01 DIAGNOSIS — L03.90: ICD-10-CM

## 2018-02-01 DIAGNOSIS — K59.00: ICD-10-CM

## 2018-02-01 DIAGNOSIS — Z47.89: Primary | ICD-10-CM

## 2018-02-01 DIAGNOSIS — L03.113: ICD-10-CM

## 2018-02-01 DIAGNOSIS — Z85.01: ICD-10-CM

## 2018-02-01 DIAGNOSIS — I10: ICD-10-CM

## 2018-02-01 DIAGNOSIS — M51.16: ICD-10-CM

## 2018-02-01 LAB
ABNORMAL IP MESSAGE: 1
ADD MAN DIFF?: NO
ADD UMIC: YES
ANION GAP: 13 (ref 8–16)
BASOPHIL #: 0 10^3/UL (ref 0–0.1)
BASOPHILS %: 0.6 % (ref 0–2)
BLOOD UREA NITROGEN: 14 MG/DL (ref 7–20)
CALCIUM: 9.3 MG/DL (ref 8.4–10.2)
CARBON DIOXIDE: 30 MMOL/L (ref 21–31)
CHLORIDE: 100 MMOL/L (ref 97–110)
CREATININE: 0.97 MG/DL (ref 0.61–1.24)
EOSINOPHILS #: 0.3 10^3/UL (ref 0–0.5)
EOSINOPHILS %: 4.6 % (ref 0–7)
GLUCOSE: 92 MG/DL (ref 70–220)
HEMATOCRIT: 33.2 % (ref 42–52)
HEMOGLOBIN: 9.9 G/DL (ref 14–18)
LYMPHOCYTES #: 1.8 10^3/UL (ref 0.8–2.9)
LYMPHOCYTES %: 32.1 % (ref 15–51)
MAGNESIUM: 1.8 MG/DL (ref 1.7–2.5)
MEAN CORPUSCULAR HEMOGLOBIN: 21.6 PG (ref 29–33)
MEAN CORPUSCULAR HGB CONC: 29.8 G/DL (ref 32–37)
MEAN CORPUSCULAR VOLUME: 72.3 FL (ref 82–101)
MEAN PLATELET VOLUME: 10.7 FL (ref 7.4–10.4)
MONOCYTE #: 0.5 10^3/UL (ref 0.3–0.9)
MONOCYTES %: 9.9 % (ref 0–11)
NEUTROPHIL #: 2.9 10^3/UL (ref 1.6–7.5)
NEUTROPHILS %: 52.4 % (ref 39–77)
NUCLEATED RED BLOOD CELLS #: 0 10^3/UL (ref 0–0)
NUCLEATED RED BLOOD CELLS%: 0 /100WBC (ref 0–0)
PHOSPHORUS: 5.3 MG/DL (ref 2.5–4.9)
PLATELET COUNT: 185 10^3/UL (ref 140–415)
POSITIVE DIFF: (no result)
POTASSIUM: 3.9 MMOL/L (ref 3.5–5.1)
RED BLOOD COUNT: 4.59 10^6/UL (ref 4.7–6.1)
RED CELL DISTRIBUTION WIDTH: 23 % (ref 11.5–14.5)
SODIUM: 139 MMOL/L (ref 135–144)
UR ASCORBIC ACID: NEGATIVE MG/DL
UR BILIRUBIN (DIP): NEGATIVE MG/DL
UR BLOOD (DIP): NEGATIVE MG/DL
UR CALCIUM OXALATE CRYSTAL: (no result) /HPF
UR CLARITY: (no result)
UR COLOR: YELLOW
UR GLUCOSE (DIP): NEGATIVE MG/DL
UR KETONES (DIP): NEGATIVE MG/DL
UR LEUKOCYTE ESTERASE (DIP): (no result) LEU/UL
UR NITRITE (DIP): NEGATIVE MG/DL
UR NONSQUAMOUS EPITHELIAL CELL: 1 /HPF
UR PH (DIP): 6 (ref 5–9)
UR RBC: 1 /HPF (ref 0–5)
UR SPECIFIC GRAVITY (DIP): 1.01 (ref 1–1.03)
UR SQUAMOUS EPITHELIAL CELL: (no result) /HPF
UR TOTAL PROTEIN (DIP): NEGATIVE MG/DL
UR UROBILINOGEN (DIP): (no result) MG/DL
UR WBC: 14 /HPF (ref 0–5)
WHITE BLOOD COUNT: 5.5 10^3/UL (ref 4.8–10.8)

## 2018-02-01 PROCEDURE — 87081 CULTURE SCREEN ONLY: CPT

## 2018-02-01 PROCEDURE — 81001 URINALYSIS AUTO W/SCOPE: CPT

## 2018-02-01 PROCEDURE — 85025 COMPLETE CBC W/AUTO DIFF WBC: CPT

## 2018-02-01 PROCEDURE — F08Z2ZZ GROOMING/PERSONAL HYGIENE TREATMENT: ICD-10-PCS

## 2018-02-01 PROCEDURE — 84520 ASSAY OF UREA NITROGEN: CPT

## 2018-02-01 PROCEDURE — 80048 BASIC METABOLIC PNL TOTAL CA: CPT

## 2018-02-01 PROCEDURE — 97535 SELF CARE MNGMENT TRAINING: CPT

## 2018-02-01 PROCEDURE — 97530 THERAPEUTIC ACTIVITIES: CPT

## 2018-02-01 PROCEDURE — 82565 ASSAY OF CREATININE: CPT

## 2018-02-01 PROCEDURE — 97150 GROUP THERAPEUTIC PROCEDURES: CPT

## 2018-02-01 PROCEDURE — 80202 ASSAY OF VANCOMYCIN: CPT

## 2018-02-01 PROCEDURE — F07Z8ZZ TRANSFER TRAINING TREATMENT: ICD-10-PCS

## 2018-02-01 PROCEDURE — 97167 OT EVAL HIGH COMPLEX 60 MIN: CPT

## 2018-02-01 PROCEDURE — 82728 ASSAY OF FERRITIN: CPT

## 2018-02-01 PROCEDURE — 97163 PT EVAL HIGH COMPLEX 45 MIN: CPT

## 2018-02-01 PROCEDURE — 87086 URINE CULTURE/COLONY COUNT: CPT

## 2018-02-01 PROCEDURE — 97110 THERAPEUTIC EXERCISES: CPT

## 2018-02-01 PROCEDURE — 83540 ASSAY OF IRON: CPT

## 2018-02-01 PROCEDURE — 84100 ASSAY OF PHOSPHORUS: CPT

## 2018-02-01 PROCEDURE — F08Z1ZZ DRESSING TECHNIQUES TREATMENT: ICD-10-PCS

## 2018-02-01 PROCEDURE — 97112 NEUROMUSCULAR REEDUCATION: CPT

## 2018-02-01 PROCEDURE — F07Z5ZZ BED MOBILITY TREATMENT: ICD-10-PCS

## 2018-02-01 PROCEDURE — F07Z9ZZ GAIT TRAINING/FUNCTIONAL AMBULATION TREATMENT: ICD-10-PCS

## 2018-02-01 PROCEDURE — 80053 COMPREHEN METABOLIC PANEL: CPT

## 2018-02-01 PROCEDURE — 83735 ASSAY OF MAGNESIUM: CPT

## 2018-02-01 PROCEDURE — 97116 GAIT TRAINING THERAPY: CPT

## 2018-02-01 RX ADMIN — METOPROLOL TARTRATE 1 MG: 25 TABLET ORAL at 21:06

## 2018-02-01 RX ADMIN — GABAPENTIN 1 MG: 400 CAPSULE ORAL at 05:32

## 2018-02-01 RX ADMIN — BENZOCAINE, MENTHOL 1 LOZENGE: 15; 3.6 LOZENGE ORAL at 09:30

## 2018-02-01 RX ADMIN — DOCUSATE SODIUM 1 MG: 100 CAPSULE, LIQUID FILLED ORAL at 09:31

## 2018-02-01 RX ADMIN — CYANOCOBALAMIN TAB 500 MCG 1 MCG: 500 TAB at 09:39

## 2018-02-01 RX ADMIN — APIXABAN 1 MG: 5 TABLET, FILM COATED ORAL at 22:21

## 2018-02-01 RX ADMIN — OXYCODONE HYDROCHLORIDE 1 MG: 20 TABLET, FILM COATED, EXTENDED RELEASE ORAL at 12:10

## 2018-02-01 RX ADMIN — SENNOSIDES 1 TAB: 8.6 TABLET, FILM COATED ORAL at 21:06

## 2018-02-01 RX ADMIN — OXYCODONE HYDROCHLORIDE 1 MG: 20 TABLET, FILM COATED, EXTENDED RELEASE ORAL at 21:05

## 2018-02-01 RX ADMIN — AMLODIPINE BESYLATE 1 MG: 2.5 TABLET ORAL at 22:21

## 2018-02-01 RX ADMIN — POLYETHYLENE GLYCOL 3350 1 GM: 17 POWDER, FOR SOLUTION ORAL at 09:31

## 2018-02-01 RX ADMIN — APIXABAN 1 MG: 5 TABLET, FILM COATED ORAL at 09:31

## 2018-02-01 RX ADMIN — OXYCODONE HYDROCHLORIDE 1 MG: 20 TABLET, FILM COATED, EXTENDED RELEASE ORAL at 03:56

## 2018-02-01 RX ADMIN — BENZOCAINE, MENTHOL 1 LOZENGE: 15; 3.6 LOZENGE ORAL at 02:35

## 2018-02-01 RX ADMIN — AMLODIPINE BESYLATE 1 MG: 2.5 TABLET ORAL at 09:00

## 2018-02-01 RX ADMIN — BENZOCAINE, MENTHOL 1 LOZENGE: 15; 3.6 LOZENGE ORAL at 12:10

## 2018-02-01 RX ADMIN — DOCUSATE SODIUM 1 MG: 100 CAPSULE, LIQUID FILLED ORAL at 21:06

## 2018-02-01 RX ADMIN — TAMSULOSIN HYDROCHLORIDE 1 MG: 0.4 CAPSULE ORAL at 21:06

## 2018-02-01 RX ADMIN — HYDROMORPHONE HYDROCHLORIDE 1 MG: 1 INJECTION, SOLUTION INTRAMUSCULAR; INTRAVENOUS; SUBCUTANEOUS at 12:10

## 2018-02-01 RX ADMIN — CYCLOBENZAPRINE 1 MG: 10 TABLET, FILM COATED ORAL at 09:31

## 2018-02-01 RX ADMIN — METOPROLOL TARTRATE 1 MG: 25 TABLET ORAL at 09:32

## 2018-02-01 RX ADMIN — GABAPENTIN 1 MG: 400 CAPSULE ORAL at 12:10

## 2018-02-01 RX ADMIN — BENZOCAINE, MENTHOL 1 LOZENGE: 15; 3.6 LOZENGE ORAL at 04:00

## 2018-02-01 RX ADMIN — HYDROMORPHONE HYDROCHLORIDE 1 MG: 1 INJECTION, SOLUTION INTRAMUSCULAR; INTRAVENOUS; SUBCUTANEOUS at 09:30

## 2018-02-01 RX ADMIN — CYCLOBENZAPRINE 1 MG: 10 TABLET, FILM COATED ORAL at 21:06

## 2018-02-02 LAB
ABNORMAL IP MESSAGE: 1
ADD MAN DIFF?: NO
ALANINE AMINOTRANSFERASE: 54 IU/L (ref 13–69)
ALBUMIN/GLOBULIN RATIO: 1.17
ALBUMIN: 3.4 G/DL (ref 3.3–4.9)
ALKALINE PHOSPHATASE: 81 IU/L (ref 42–121)
ANION GAP: 12 (ref 8–16)
ASPARTATE AMINO TRANSFERASE: 31 IU/L (ref 15–46)
BASOPHIL #: 0 10^3/UL (ref 0–0.1)
BASOPHILS %: 0.6 % (ref 0–2)
BILIRUBIN,DIRECT: 0 MG/DL (ref 0–0.2)
BILIRUBIN,TOTAL: 0 MG/DL (ref 0.2–1.3)
BLOOD UREA NITROGEN: 15 MG/DL (ref 7–20)
CALCIUM: 9.2 MG/DL (ref 8.4–10.2)
CARBON DIOXIDE: 31 MMOL/L (ref 21–31)
CHLORIDE: 101 MMOL/L (ref 97–110)
CREATININE: 1.01 MG/DL (ref 0.61–1.24)
EOSINOPHILS #: 0.2 10^3/UL (ref 0–0.5)
EOSINOPHILS %: 3.9 % (ref 0–7)
GLOBULIN: 2.9 G/DL (ref 1.3–3.2)
GLUCOSE: 108 MG/DL (ref 70–220)
HEMATOCRIT: 32.3 % (ref 42–52)
HEMOGLOBIN: 9.7 G/DL (ref 14–18)
LYMPHOCYTES #: 1.5 10^3/UL (ref 0.8–2.9)
LYMPHOCYTES %: 29.7 % (ref 15–51)
MEAN CORPUSCULAR HEMOGLOBIN: 21.6 PG (ref 29–33)
MEAN CORPUSCULAR HGB CONC: 30 G/DL (ref 32–37)
MEAN CORPUSCULAR VOLUME: 71.9 FL (ref 82–101)
MEAN PLATELET VOLUME: 11.1 FL (ref 7.4–10.4)
MONOCYTE #: 0.5 10^3/UL (ref 0.3–0.9)
MONOCYTES %: 9.1 % (ref 0–11)
NEUTROPHIL #: 2.9 10^3/UL (ref 1.6–7.5)
NEUTROPHILS %: 56.5 % (ref 39–77)
NUCLEATED RED BLOOD CELLS #: 0 10^3/UL (ref 0–0)
NUCLEATED RED BLOOD CELLS%: 0 /100WBC (ref 0–0)
PLATELET COUNT: 193 10^3/UL (ref 140–415)
POSITIVE DIFF: (no result)
POTASSIUM: 4.2 MMOL/L (ref 3.5–5.1)
RED BLOOD COUNT: 4.49 10^6/UL (ref 4.7–6.1)
RED CELL DISTRIBUTION WIDTH: 23.5 % (ref 11.5–14.5)
SODIUM: 140 MMOL/L (ref 135–144)
TOTAL PROTEIN: 6.3 G/DL (ref 6.1–8.1)
WHITE BLOOD COUNT: 5.2 10^3/UL (ref 4.8–10.8)

## 2018-02-02 RX ADMIN — GABAPENTIN 1 MG: 400 CAPSULE ORAL at 12:49

## 2018-02-02 RX ADMIN — CEPHALEXIN 1 MG: 500 CAPSULE ORAL at 21:07

## 2018-02-02 RX ADMIN — HYDROMORPHONE HYDROCHLORIDE 1 MG: 1 INJECTION, SOLUTION INTRAMUSCULAR; INTRAVENOUS; SUBCUTANEOUS at 19:08

## 2018-02-02 RX ADMIN — HYDROMORPHONE HYDROCHLORIDE 1 MG: 1 INJECTION, SOLUTION INTRAMUSCULAR; INTRAVENOUS; SUBCUTANEOUS at 15:39

## 2018-02-02 RX ADMIN — APIXABAN 1 MG: 5 TABLET, FILM COATED ORAL at 10:30

## 2018-02-02 RX ADMIN — CYANOCOBALAMIN TAB 500 MCG 1 MCG: 500 TAB at 10:31

## 2018-02-02 RX ADMIN — HYDROMORPHONE HYDROCHLORIDE 1 MG: 1 INJECTION, SOLUTION INTRAMUSCULAR; INTRAVENOUS; SUBCUTANEOUS at 02:55

## 2018-02-02 RX ADMIN — CYCLOBENZAPRINE 1 MG: 10 TABLET, FILM COATED ORAL at 21:07

## 2018-02-02 RX ADMIN — OXYCODONE HYDROCHLORIDE 1 MG: 20 TABLET, FILM COATED, EXTENDED RELEASE ORAL at 12:48

## 2018-02-02 RX ADMIN — METOPROLOL TARTRATE 1 MG: 25 TABLET ORAL at 21:22

## 2018-02-02 RX ADMIN — GABAPENTIN 1 MG: 400 CAPSULE ORAL at 06:29

## 2018-02-02 RX ADMIN — AMLODIPINE BESYLATE 1 MG: 2.5 TABLET ORAL at 09:00

## 2018-02-02 RX ADMIN — HYDROMORPHONE HYDROCHLORIDE 1 MG: 1 INJECTION, SOLUTION INTRAMUSCULAR; INTRAVENOUS; SUBCUTANEOUS at 10:34

## 2018-02-02 RX ADMIN — CEPHALEXIN 1 MG: 500 CAPSULE ORAL at 12:48

## 2018-02-02 RX ADMIN — Medication 1 TAB: at 21:07

## 2018-02-02 RX ADMIN — Medication 1 TAB: at 10:30

## 2018-02-02 RX ADMIN — AMLODIPINE BESYLATE 1 MG: 2.5 TABLET ORAL at 21:00

## 2018-02-02 RX ADMIN — METOPROLOL TARTRATE 1 MG: 25 TABLET ORAL at 10:34

## 2018-02-02 RX ADMIN — GABAPENTIN 1 MG: 400 CAPSULE ORAL at 17:32

## 2018-02-02 RX ADMIN — OXYCODONE HYDROCHLORIDE 1 MG: 20 TABLET, FILM COATED, EXTENDED RELEASE ORAL at 21:08

## 2018-02-02 RX ADMIN — OXYCODONE HYDROCHLORIDE 1 MG: 20 TABLET, FILM COATED, EXTENDED RELEASE ORAL at 04:39

## 2018-02-02 RX ADMIN — DOCUSATE SODIUM 1 MG: 100 CAPSULE, LIQUID FILLED ORAL at 21:07

## 2018-02-02 RX ADMIN — GABAPENTIN 1 MG: 400 CAPSULE ORAL at 00:17

## 2018-02-02 RX ADMIN — CEPHALEXIN 1 MG: 500 CAPSULE ORAL at 14:33

## 2018-02-02 RX ADMIN — POLYETHYLENE GLYCOL 3350 1 GM: 17 POWDER, FOR SOLUTION ORAL at 10:30

## 2018-02-02 RX ADMIN — CYCLOBENZAPRINE 1 MG: 10 TABLET, FILM COATED ORAL at 10:30

## 2018-02-02 RX ADMIN — SENNOSIDES 1 TAB: 8.6 TABLET, FILM COATED ORAL at 21:23

## 2018-02-02 RX ADMIN — TAMSULOSIN HYDROCHLORIDE 1 MG: 0.4 CAPSULE ORAL at 21:23

## 2018-02-02 RX ADMIN — HYDROMORPHONE HYDROCHLORIDE 1 MG: 1 INJECTION, SOLUTION INTRAMUSCULAR; INTRAVENOUS; SUBCUTANEOUS at 11:55

## 2018-02-02 RX ADMIN — DOCUSATE SODIUM 1 MG: 100 CAPSULE, LIQUID FILLED ORAL at 10:29

## 2018-02-02 RX ADMIN — APIXABAN 1 MG: 5 TABLET, FILM COATED ORAL at 21:07

## 2018-02-03 RX ADMIN — APIXABAN 1 MG: 5 TABLET, FILM COATED ORAL at 20:10

## 2018-02-03 RX ADMIN — Medication 1 TAB: at 20:11

## 2018-02-03 RX ADMIN — CYCLOBENZAPRINE 1 MG: 10 TABLET, FILM COATED ORAL at 09:18

## 2018-02-03 RX ADMIN — CYCLOBENZAPRINE 1 MG: 10 TABLET, FILM COATED ORAL at 20:13

## 2018-02-03 RX ADMIN — OXYCODONE HYDROCHLORIDE 1 MG: 20 TABLET, FILM COATED, EXTENDED RELEASE ORAL at 12:27

## 2018-02-03 RX ADMIN — METOPROLOL TARTRATE 1 MG: 25 TABLET ORAL at 20:12

## 2018-02-03 RX ADMIN — GABAPENTIN 1 MG: 400 CAPSULE ORAL at 23:57

## 2018-02-03 RX ADMIN — CEPHALEXIN 1 MG: 500 CAPSULE ORAL at 14:10

## 2018-02-03 RX ADMIN — CEPHALEXIN 1 MG: 500 CAPSULE ORAL at 05:49

## 2018-02-03 RX ADMIN — TAMSULOSIN HYDROCHLORIDE 1 MG: 0.4 CAPSULE ORAL at 20:12

## 2018-02-03 RX ADMIN — GABAPENTIN 1 MG: 400 CAPSULE ORAL at 12:27

## 2018-02-03 RX ADMIN — GABAPENTIN 1 MG: 400 CAPSULE ORAL at 18:03

## 2018-02-03 RX ADMIN — POLYETHYLENE GLYCOL 3350 1 GM: 17 POWDER, FOR SOLUTION ORAL at 09:19

## 2018-02-03 RX ADMIN — DOCUSATE SODIUM 1 MG: 100 CAPSULE, LIQUID FILLED ORAL at 09:19

## 2018-02-03 RX ADMIN — HYDROMORPHONE HYDROCHLORIDE 1 MG: 1 INJECTION, SOLUTION INTRAMUSCULAR; INTRAVENOUS; SUBCUTANEOUS at 16:27

## 2018-02-03 RX ADMIN — AMLODIPINE BESYLATE 1 MG: 2.5 TABLET ORAL at 09:18

## 2018-02-03 RX ADMIN — DOCUSATE SODIUM 1 MG: 100 CAPSULE, LIQUID FILLED ORAL at 20:11

## 2018-02-03 RX ADMIN — CYANOCOBALAMIN TAB 500 MCG 1 MCG: 500 TAB at 09:18

## 2018-02-03 RX ADMIN — HYDROMORPHONE HYDROCHLORIDE 1 MG: 1 INJECTION, SOLUTION INTRAMUSCULAR; INTRAVENOUS; SUBCUTANEOUS at 18:04

## 2018-02-03 RX ADMIN — HYDROMORPHONE HYDROCHLORIDE 1 MG: 1 INJECTION, SOLUTION INTRAMUSCULAR; INTRAVENOUS; SUBCUTANEOUS at 14:11

## 2018-02-03 RX ADMIN — Medication 1 TAB: at 09:18

## 2018-02-03 RX ADMIN — AMLODIPINE BESYLATE 1 MG: 2.5 TABLET ORAL at 20:12

## 2018-02-03 RX ADMIN — METOPROLOL TARTRATE 1 MG: 25 TABLET ORAL at 09:19

## 2018-02-03 RX ADMIN — GABAPENTIN 1 MG: 400 CAPSULE ORAL at 05:49

## 2018-02-03 RX ADMIN — HYDROMORPHONE HYDROCHLORIDE 1 MG: 1 INJECTION, SOLUTION INTRAMUSCULAR; INTRAVENOUS; SUBCUTANEOUS at 09:22

## 2018-02-03 RX ADMIN — OXYCODONE HYDROCHLORIDE 1 MG: 20 TABLET, FILM COATED, EXTENDED RELEASE ORAL at 04:40

## 2018-02-03 RX ADMIN — MAGNESIUM HYDROXIDE 1 ML: 400 SUSPENSION ORAL at 05:49

## 2018-02-03 RX ADMIN — CEPHALEXIN 1 MG: 500 CAPSULE ORAL at 21:38

## 2018-02-03 RX ADMIN — GABAPENTIN 1 MG: 400 CAPSULE ORAL at 00:02

## 2018-02-03 RX ADMIN — HYDROMORPHONE HYDROCHLORIDE 1 MG: 1 INJECTION, SOLUTION INTRAMUSCULAR; INTRAVENOUS; SUBCUTANEOUS at 02:26

## 2018-02-03 RX ADMIN — APIXABAN 1 MG: 5 TABLET, FILM COATED ORAL at 09:19

## 2018-02-03 RX ADMIN — OXYCODONE HYDROCHLORIDE 1 MG: 20 TABLET, FILM COATED, EXTENDED RELEASE ORAL at 20:11

## 2018-02-03 RX ADMIN — SENNOSIDES 1 TAB: 8.6 TABLET, FILM COATED ORAL at 20:11

## 2018-02-04 LAB
ABNORMAL IP MESSAGE: 1
ADD MAN DIFF?: NO
BASOPHIL #: 0 10^3/UL (ref 0–0.1)
BASOPHILS %: 0.4 % (ref 0–2)
EOSINOPHILS #: 0.2 10^3/UL (ref 0–0.5)
EOSINOPHILS %: 3.1 % (ref 0–7)
HEMATOCRIT: 36 % (ref 42–52)
HEMOGLOBIN: 10.8 G/DL (ref 14–18)
LYMPHOCYTES #: 1.5 10^3/UL (ref 0.8–2.9)
LYMPHOCYTES %: 21.6 % (ref 15–51)
MEAN CORPUSCULAR HEMOGLOBIN: 21.9 PG (ref 29–33)
MEAN CORPUSCULAR HGB CONC: 30 G/DL (ref 32–37)
MEAN CORPUSCULAR VOLUME: 72.9 FL (ref 82–101)
MEAN PLATELET VOLUME: 11.1 FL (ref 7.4–10.4)
MONOCYTE #: 0.4 10^3/UL (ref 0.3–0.9)
MONOCYTES %: 6.4 % (ref 0–11)
NEUTROPHIL #: 4.6 10^3/UL (ref 1.6–7.5)
NEUTROPHILS %: 68.4 % (ref 39–77)
NUCLEATED RED BLOOD CELLS #: 0 10^3/UL (ref 0–0)
NUCLEATED RED BLOOD CELLS%: 0 /100WBC (ref 0–0)
PLATELET COUNT: 216 10^3/UL (ref 140–415)
POSITIVE DIFF: (no result)
RED BLOOD COUNT: 4.94 10^6/UL (ref 4.7–6.1)
RED CELL DISTRIBUTION WIDTH: 23.9 % (ref 11.5–14.5)
WHITE BLOOD COUNT: 6.7 10^3/UL (ref 4.8–10.8)

## 2018-02-04 RX ADMIN — APIXABAN 1 MG: 5 TABLET, FILM COATED ORAL at 09:25

## 2018-02-04 RX ADMIN — AMLODIPINE BESYLATE 1 MG: 2.5 TABLET ORAL at 20:15

## 2018-02-04 RX ADMIN — CEPHALEXIN 1 MG: 500 CAPSULE ORAL at 20:20

## 2018-02-04 RX ADMIN — METOPROLOL TARTRATE 1 MG: 25 TABLET ORAL at 20:15

## 2018-02-04 RX ADMIN — OXYCODONE HYDROCHLORIDE 1 MG: 20 TABLET, FILM COATED, EXTENDED RELEASE ORAL at 04:56

## 2018-02-04 RX ADMIN — Medication 1 TAB: at 20:14

## 2018-02-04 RX ADMIN — AMLODIPINE BESYLATE 1 MG: 2.5 TABLET ORAL at 09:25

## 2018-02-04 RX ADMIN — SENNOSIDES 1 TAB: 8.6 TABLET, FILM COATED ORAL at 20:14

## 2018-02-04 RX ADMIN — GABAPENTIN 1 MG: 400 CAPSULE ORAL at 17:25

## 2018-02-04 RX ADMIN — Medication 1 TAB: at 09:24

## 2018-02-04 RX ADMIN — HYDROMORPHONE HYDROCHLORIDE 1 MG: 1 INJECTION, SOLUTION INTRAMUSCULAR; INTRAVENOUS; SUBCUTANEOUS at 02:25

## 2018-02-04 RX ADMIN — BACLOFEN 1 MG: 10 TABLET ORAL at 09:24

## 2018-02-04 RX ADMIN — OXYCODONE HYDROCHLORIDE 1 MG: 40 TABLET, FILM COATED, EXTENDED RELEASE ORAL at 17:25

## 2018-02-04 RX ADMIN — CYANOCOBALAMIN TAB 500 MCG 1 MCG: 500 TAB at 09:25

## 2018-02-04 RX ADMIN — METOPROLOL TARTRATE 1 MG: 25 TABLET ORAL at 09:25

## 2018-02-04 RX ADMIN — POLYETHYLENE GLYCOL 3350 1 GM: 17 POWDER, FOR SOLUTION ORAL at 09:25

## 2018-02-04 RX ADMIN — GABAPENTIN 1 MG: 400 CAPSULE ORAL at 12:26

## 2018-02-04 RX ADMIN — TAMSULOSIN HYDROCHLORIDE 1 MG: 0.4 CAPSULE ORAL at 20:14

## 2018-02-04 RX ADMIN — DOCUSATE SODIUM 1 MG: 100 CAPSULE, LIQUID FILLED ORAL at 20:14

## 2018-02-04 RX ADMIN — CEPHALEXIN 1 MG: 500 CAPSULE ORAL at 05:49

## 2018-02-04 RX ADMIN — HYDROMORPHONE HYDROCHLORIDE 1 MG: 2 TABLET ORAL at 20:14

## 2018-02-04 RX ADMIN — GABAPENTIN 1 MG: 400 CAPSULE ORAL at 05:49

## 2018-02-04 RX ADMIN — HYDROMORPHONE HYDROCHLORIDE 1 MG: 2 TABLET ORAL at 12:35

## 2018-02-04 RX ADMIN — APIXABAN 1 MG: 5 TABLET, FILM COATED ORAL at 20:14

## 2018-02-04 RX ADMIN — OXYCODONE HYDROCHLORIDE 1 MG: 40 TABLET, FILM COATED, EXTENDED RELEASE ORAL at 10:28

## 2018-02-04 RX ADMIN — CEPHALEXIN 1 MG: 500 CAPSULE ORAL at 13:26

## 2018-02-04 RX ADMIN — OXYCODONE HYDROCHLORIDE 1 MG: 40 TABLET, FILM COATED, EXTENDED RELEASE ORAL at 13:26

## 2018-02-04 RX ADMIN — HYDROMORPHONE HYDROCHLORIDE 1 MG: 1 INJECTION, SOLUTION INTRAMUSCULAR; INTRAVENOUS; SUBCUTANEOUS at 07:25

## 2018-02-04 RX ADMIN — DOCUSATE SODIUM 1 MG: 100 CAPSULE, LIQUID FILLED ORAL at 09:25

## 2018-02-04 RX ADMIN — BACLOFEN 1 MG: 10 TABLET ORAL at 20:14

## 2018-02-05 LAB
BLOOD UREA NITROGEN: 14 MG/DL (ref 7–20)
CREATININE: 0.99 MG/DL (ref 0.61–1.24)

## 2018-02-05 RX ADMIN — DOCUSATE SODIUM 1 MG: 100 CAPSULE, LIQUID FILLED ORAL at 09:25

## 2018-02-05 RX ADMIN — OXYCODONE HYDROCHLORIDE 1 MG: 40 TABLET, FILM COATED, EXTENDED RELEASE ORAL at 17:36

## 2018-02-05 RX ADMIN — HYDROMORPHONE HYDROCHLORIDE 1 MG: 2 TABLET ORAL at 11:29

## 2018-02-05 RX ADMIN — POLYETHYLENE GLYCOL 3350 1 GM: 17 POWDER, FOR SOLUTION ORAL at 09:27

## 2018-02-05 RX ADMIN — CYANOCOBALAMIN TAB 500 MCG 1 MCG: 500 TAB at 09:25

## 2018-02-05 RX ADMIN — HYDROMORPHONE HYDROCHLORIDE 1 MG: 2 TABLET ORAL at 21:11

## 2018-02-05 RX ADMIN — GABAPENTIN 1 MG: 400 CAPSULE ORAL at 17:37

## 2018-02-05 RX ADMIN — VANCOMYCIN HYDROCHLORIDE 1 MLS/HR: 1 INJECTION, POWDER, LYOPHILIZED, FOR SOLUTION INTRAVENOUS at 15:02

## 2018-02-05 RX ADMIN — SENNOSIDES 1 TAB: 8.6 TABLET, FILM COATED ORAL at 20:55

## 2018-02-05 RX ADMIN — AMLODIPINE BESYLATE 1 MG: 2.5 TABLET ORAL at 09:26

## 2018-02-05 RX ADMIN — Medication 1 TAB: at 20:56

## 2018-02-05 RX ADMIN — GABAPENTIN 1 MG: 400 CAPSULE ORAL at 01:37

## 2018-02-05 RX ADMIN — OXYCODONE HYDROCHLORIDE 1 MG: 40 TABLET, FILM COATED, EXTENDED RELEASE ORAL at 12:55

## 2018-02-05 RX ADMIN — HYDROMORPHONE HYDROCHLORIDE 1 MG: 1 INJECTION, SOLUTION INTRAMUSCULAR; INTRAVENOUS; SUBCUTANEOUS at 16:07

## 2018-02-05 RX ADMIN — BACLOFEN 1 MG: 10 TABLET ORAL at 20:52

## 2018-02-05 RX ADMIN — AMLODIPINE BESYLATE 1 MG: 2.5 TABLET ORAL at 20:54

## 2018-02-05 RX ADMIN — HYDROMORPHONE HYDROCHLORIDE 1 MG: 1 INJECTION, SOLUTION INTRAMUSCULAR; INTRAVENOUS; SUBCUTANEOUS at 13:03

## 2018-02-05 RX ADMIN — VANCOMYCIN HYDROCHLORIDE 1 MLS/HR: 1 INJECTION, POWDER, LYOPHILIZED, FOR SOLUTION INTRAVENOUS at 11:29

## 2018-02-05 RX ADMIN — Medication 1 TAB: at 09:25

## 2018-02-05 RX ADMIN — HYDROMORPHONE HYDROCHLORIDE 1 MG: 1 INJECTION, SOLUTION INTRAMUSCULAR; INTRAVENOUS; SUBCUTANEOUS at 11:47

## 2018-02-05 RX ADMIN — METOPROLOL TARTRATE 1 MG: 25 TABLET ORAL at 09:26

## 2018-02-05 RX ADMIN — HYDROMORPHONE HYDROCHLORIDE 1 MG: 2 TABLET ORAL at 06:27

## 2018-02-05 RX ADMIN — APIXABAN 1 MG: 5 TABLET, FILM COATED ORAL at 09:26

## 2018-02-05 RX ADMIN — APIXABAN 1 MG: 5 TABLET, FILM COATED ORAL at 20:55

## 2018-02-05 RX ADMIN — HYDROMORPHONE HYDROCHLORIDE 1 MG: 1 INJECTION, SOLUTION INTRAMUSCULAR; INTRAVENOUS; SUBCUTANEOUS at 09:10

## 2018-02-05 RX ADMIN — HYDROMORPHONE HYDROCHLORIDE 1 MG: 2 TABLET ORAL at 01:38

## 2018-02-05 RX ADMIN — TAMSULOSIN HYDROCHLORIDE 1 MG: 0.4 CAPSULE ORAL at 20:56

## 2018-02-05 RX ADMIN — GABAPENTIN 1 MG: 400 CAPSULE ORAL at 12:53

## 2018-02-05 RX ADMIN — HYDROMORPHONE HYDROCHLORIDE 1 MG: 1 INJECTION, SOLUTION INTRAMUSCULAR; INTRAVENOUS; SUBCUTANEOUS at 18:32

## 2018-02-05 RX ADMIN — OXYCODONE HYDROCHLORIDE 1 MG: 40 TABLET, FILM COATED, EXTENDED RELEASE ORAL at 09:25

## 2018-02-05 RX ADMIN — GABAPENTIN 1 MG: 400 CAPSULE ORAL at 06:28

## 2018-02-05 RX ADMIN — DOCUSATE SODIUM 1 MG: 100 CAPSULE, LIQUID FILLED ORAL at 20:52

## 2018-02-05 RX ADMIN — HYDROMORPHONE HYDROCHLORIDE 1 MG: 2 TABLET ORAL at 15:25

## 2018-02-05 RX ADMIN — HYDROMORPHONE HYDROCHLORIDE 1 MG: 1 INJECTION, SOLUTION INTRAMUSCULAR; INTRAVENOUS; SUBCUTANEOUS at 19:40

## 2018-02-05 RX ADMIN — METOPROLOL TARTRATE 1 MG: 25 TABLET ORAL at 20:54

## 2018-02-05 RX ADMIN — CEPHALEXIN 1 MG: 500 CAPSULE ORAL at 06:27

## 2018-02-05 RX ADMIN — BACLOFEN 1 MG: 10 TABLET ORAL at 09:26

## 2018-02-06 LAB
ABNORMAL IP MESSAGE: 1
ADD MAN DIFF?: NO
ANION GAP: 13 (ref 8–16)
BASOPHIL #: 0 10^3/UL (ref 0–0.1)
BASOPHILS %: 0.4 % (ref 0–2)
BLOOD UREA NITROGEN: 14 MG/DL (ref 7–20)
CALCIUM: 9.7 MG/DL (ref 8.4–10.2)
CARBON DIOXIDE: 32 MMOL/L (ref 21–31)
CHLORIDE: 99 MMOL/L (ref 97–110)
CREATININE: 0.97 MG/DL (ref 0.61–1.24)
EOSINOPHILS #: 0.2 10^3/UL (ref 0–0.5)
EOSINOPHILS %: 4.4 % (ref 0–7)
GLUCOSE: 115 MG/DL (ref 70–220)
HEMATOCRIT: 34.4 % (ref 42–52)
HEMOGLOBIN: 10.2 G/DL (ref 14–18)
LYMPHOCYTES #: 1.6 10^3/UL (ref 0.8–2.9)
LYMPHOCYTES %: 33.2 % (ref 15–51)
MAGNESIUM: 1.8 MG/DL (ref 1.7–2.5)
MEAN CORPUSCULAR HEMOGLOBIN: 21.6 PG (ref 29–33)
MEAN CORPUSCULAR HGB CONC: 29.7 G/DL (ref 32–37)
MEAN CORPUSCULAR VOLUME: 72.7 FL (ref 82–101)
MEAN PLATELET VOLUME: 10.8 FL (ref 7.4–10.4)
MONOCYTE #: 0.5 10^3/UL (ref 0.3–0.9)
MONOCYTES %: 10.5 % (ref 0–11)
NEUTROPHIL #: 2.4 10^3/UL (ref 1.6–7.5)
NEUTROPHILS %: 51.3 % (ref 39–77)
NUCLEATED RED BLOOD CELLS #: 0 10^3/UL (ref 0–0)
NUCLEATED RED BLOOD CELLS%: 0 /100WBC (ref 0–0)
PHOSPHORUS: 5.5 MG/DL (ref 2.5–4.9)
PLATELET COUNT: 236 10^3/UL (ref 140–415)
POSITIVE DIFF: (no result)
POTASSIUM: 3.9 MMOL/L (ref 3.5–5.1)
RED BLOOD COUNT: 4.73 10^6/UL (ref 4.7–6.1)
RED CELL DISTRIBUTION WIDTH: 23.9 % (ref 11.5–14.5)
SODIUM: 140 MMOL/L (ref 135–144)
WHITE BLOOD COUNT: 4.8 10^3/UL (ref 4.8–10.8)

## 2018-02-06 RX ADMIN — Medication 1 TAB: at 08:18

## 2018-02-06 RX ADMIN — APIXABAN 1 MG: 5 TABLET, FILM COATED ORAL at 20:04

## 2018-02-06 RX ADMIN — OXYCODONE HYDROCHLORIDE 1 MG: 40 TABLET, FILM COATED, EXTENDED RELEASE ORAL at 08:18

## 2018-02-06 RX ADMIN — HYDROMORPHONE HYDROCHLORIDE 1 MG: 1 INJECTION, SOLUTION INTRAMUSCULAR; INTRAVENOUS; SUBCUTANEOUS at 05:23

## 2018-02-06 RX ADMIN — BACLOFEN 1 MG: 10 TABLET ORAL at 08:22

## 2018-02-06 RX ADMIN — HYDROMORPHONE HYDROCHLORIDE 1 MG: 1 INJECTION, SOLUTION INTRAMUSCULAR; INTRAVENOUS; SUBCUTANEOUS at 10:07

## 2018-02-06 RX ADMIN — HYDROMORPHONE HYDROCHLORIDE 1 MG: 2 TABLET ORAL at 21:01

## 2018-02-06 RX ADMIN — HYDROMORPHONE HYDROCHLORIDE 1 MG: 2 TABLET ORAL at 12:22

## 2018-02-06 RX ADMIN — GABAPENTIN 1 MG: 400 CAPSULE ORAL at 17:55

## 2018-02-06 RX ADMIN — METOPROLOL TARTRATE 1 MG: 25 TABLET ORAL at 08:22

## 2018-02-06 RX ADMIN — GABAPENTIN 1 MG: 400 CAPSULE ORAL at 23:06

## 2018-02-06 RX ADMIN — METOPROLOL TARTRATE 1 MG: 25 TABLET ORAL at 20:05

## 2018-02-06 RX ADMIN — GABAPENTIN 1 MG: 400 CAPSULE ORAL at 12:21

## 2018-02-06 RX ADMIN — VANCOMYCIN HYDROCHLORIDE 1 MLS/HR: 1 INJECTION, POWDER, LYOPHILIZED, FOR SOLUTION INTRAVENOUS at 17:56

## 2018-02-06 RX ADMIN — GABAPENTIN 1 MG: 400 CAPSULE ORAL at 00:06

## 2018-02-06 RX ADMIN — AMLODIPINE BESYLATE 1 MG: 2.5 TABLET ORAL at 08:21

## 2018-02-06 RX ADMIN — POLYETHYLENE GLYCOL 3350 1 GM: 17 POWDER, FOR SOLUTION ORAL at 08:18

## 2018-02-06 RX ADMIN — SENNOSIDES 1 TAB: 8.6 TABLET, FILM COATED ORAL at 20:03

## 2018-02-06 RX ADMIN — OXYCODONE HYDROCHLORIDE 1 MG: 40 TABLET, FILM COATED, EXTENDED RELEASE ORAL at 13:08

## 2018-02-06 RX ADMIN — HYDROMORPHONE HYDROCHLORIDE 1 MG: 1 INJECTION, SOLUTION INTRAMUSCULAR; INTRAVENOUS; SUBCUTANEOUS at 14:48

## 2018-02-06 RX ADMIN — ALUMINUM HYDROXIDE, MAGNESIUM HYDROXIDE, AND SIMETHICONE 1 ML: 200; 200; 20 SUSPENSION ORAL at 05:14

## 2018-02-06 RX ADMIN — CYANOCOBALAMIN TAB 500 MCG 1 MCG: 500 TAB at 08:18

## 2018-02-06 RX ADMIN — ERGOCALCIFEROL 1 UNIT: 1.25 CAPSULE ORAL at 21:01

## 2018-02-06 RX ADMIN — TAMSULOSIN HYDROCHLORIDE 1 MG: 0.4 CAPSULE ORAL at 20:03

## 2018-02-06 RX ADMIN — BACLOFEN 1 MG: 10 TABLET ORAL at 20:03

## 2018-02-06 RX ADMIN — OXYCODONE HYDROCHLORIDE 1 MG: 40 TABLET, FILM COATED, EXTENDED RELEASE ORAL at 17:55

## 2018-02-06 RX ADMIN — HYDROMORPHONE HYDROCHLORIDE 1 MG: 1 INJECTION, SOLUTION INTRAMUSCULAR; INTRAVENOUS; SUBCUTANEOUS at 22:06

## 2018-02-06 RX ADMIN — APIXABAN 1 MG: 5 TABLET, FILM COATED ORAL at 08:18

## 2018-02-06 RX ADMIN — GABAPENTIN 1 MG: 400 CAPSULE ORAL at 05:14

## 2018-02-06 RX ADMIN — VANCOMYCIN HYDROCHLORIDE 1 MLS/HR: 1 INJECTION, POWDER, LYOPHILIZED, FOR SOLUTION INTRAVENOUS at 05:14

## 2018-02-06 RX ADMIN — HYDROMORPHONE HYDROCHLORIDE 1 MG: 2 TABLET ORAL at 16:57

## 2018-02-06 RX ADMIN — HYDROMORPHONE HYDROCHLORIDE 1 MG: 1 INJECTION, SOLUTION INTRAMUSCULAR; INTRAVENOUS; SUBCUTANEOUS at 00:11

## 2018-02-06 RX ADMIN — DOCUSATE SODIUM 1 MG: 100 CAPSULE, LIQUID FILLED ORAL at 08:18

## 2018-02-06 RX ADMIN — DOCUSATE SODIUM 1 MG: 100 CAPSULE, LIQUID FILLED ORAL at 20:03

## 2018-02-06 RX ADMIN — Medication 1 TAB: at 20:04

## 2018-02-06 RX ADMIN — HYDROMORPHONE HYDROCHLORIDE 1 MG: 1 INJECTION, SOLUTION INTRAMUSCULAR; INTRAVENOUS; SUBCUTANEOUS at 20:03

## 2018-02-06 RX ADMIN — HYDROMORPHONE HYDROCHLORIDE 1 MG: 2 TABLET ORAL at 02:18

## 2018-02-06 RX ADMIN — AMLODIPINE BESYLATE 1 MG: 2.5 TABLET ORAL at 20:15

## 2018-02-07 LAB — VANCOMYCIN,TROUGH: 13.8 UG/ML (ref 10–20)

## 2018-02-07 RX ADMIN — METOPROLOL TARTRATE 1 MG: 25 TABLET ORAL at 21:36

## 2018-02-07 RX ADMIN — APIXABAN 1 MG: 5 TABLET, FILM COATED ORAL at 21:31

## 2018-02-07 RX ADMIN — BACLOFEN 1 MG: 10 TABLET ORAL at 21:31

## 2018-02-07 RX ADMIN — Medication 1 TAB: at 09:06

## 2018-02-07 RX ADMIN — AMLODIPINE BESYLATE 1 MG: 2.5 TABLET ORAL at 21:00

## 2018-02-07 RX ADMIN — CYANOCOBALAMIN TAB 500 MCG 1 MCG: 500 TAB at 09:00

## 2018-02-07 RX ADMIN — GABAPENTIN 1 MG: 400 CAPSULE ORAL at 23:36

## 2018-02-07 RX ADMIN — HYDROMORPHONE HYDROCHLORIDE 1 MG: 1 INJECTION, SOLUTION INTRAMUSCULAR; INTRAVENOUS; SUBCUTANEOUS at 06:31

## 2018-02-07 RX ADMIN — OXYCODONE HYDROCHLORIDE 1 MG: 40 TABLET, FILM COATED, EXTENDED RELEASE ORAL at 17:17

## 2018-02-07 RX ADMIN — HYDROMORPHONE HYDROCHLORIDE 1 MG: 2 TABLET ORAL at 11:47

## 2018-02-07 RX ADMIN — HYDROMORPHONE HYDROCHLORIDE 1 MG: 2 TABLET ORAL at 15:58

## 2018-02-07 RX ADMIN — HYDROMORPHONE HYDROCHLORIDE 1 MG: 2 TABLET ORAL at 21:37

## 2018-02-07 RX ADMIN — Medication 1 TAB: at 21:30

## 2018-02-07 RX ADMIN — DOCUSATE SODIUM 1 MG: 100 CAPSULE, LIQUID FILLED ORAL at 09:06

## 2018-02-07 RX ADMIN — POLYETHYLENE GLYCOL 3350 1 GM: 17 POWDER, FOR SOLUTION ORAL at 09:06

## 2018-02-07 RX ADMIN — DOCUSATE SODIUM 1 MG: 100 CAPSULE, LIQUID FILLED ORAL at 21:31

## 2018-02-07 RX ADMIN — HYDROMORPHONE HYDROCHLORIDE 1 MG: 2 TABLET ORAL at 01:07

## 2018-02-07 RX ADMIN — METOPROLOL TARTRATE 1 MG: 25 TABLET ORAL at 09:06

## 2018-02-07 RX ADMIN — SENNOSIDES 1 TAB: 8.6 TABLET, FILM COATED ORAL at 21:31

## 2018-02-07 RX ADMIN — APIXABAN 1 MG: 5 TABLET, FILM COATED ORAL at 09:06

## 2018-02-07 RX ADMIN — GABAPENTIN 1 MG: 400 CAPSULE ORAL at 12:57

## 2018-02-07 RX ADMIN — VANCOMYCIN HYDROCHLORIDE 1 MLS/HR: 1 INJECTION, POWDER, LYOPHILIZED, FOR SOLUTION INTRAVENOUS at 07:33

## 2018-02-07 RX ADMIN — TAMSULOSIN HYDROCHLORIDE 1 MG: 0.4 CAPSULE ORAL at 21:31

## 2018-02-07 RX ADMIN — GABAPENTIN 1 MG: 400 CAPSULE ORAL at 06:28

## 2018-02-07 RX ADMIN — HYDROMORPHONE HYDROCHLORIDE 1 MG: 2 TABLET ORAL at 05:04

## 2018-02-07 RX ADMIN — GABAPENTIN 1 MG: 400 CAPSULE ORAL at 17:18

## 2018-02-07 RX ADMIN — OXYCODONE HYDROCHLORIDE 1 MG: 40 TABLET, FILM COATED, EXTENDED RELEASE ORAL at 09:07

## 2018-02-07 RX ADMIN — OXYCODONE HYDROCHLORIDE 1 MG: 40 TABLET, FILM COATED, EXTENDED RELEASE ORAL at 13:36

## 2018-02-07 RX ADMIN — VANCOMYCIN HYDROCHLORIDE 1 MLS/HR: 1 INJECTION, POWDER, LYOPHILIZED, FOR SOLUTION INTRAVENOUS at 18:18

## 2018-02-07 RX ADMIN — AMLODIPINE BESYLATE 1 MG: 2.5 TABLET ORAL at 09:06

## 2018-02-07 RX ADMIN — BACLOFEN 1 MG: 10 TABLET ORAL at 09:06

## 2018-02-08 LAB
ABNORMAL IP MESSAGE: 1
ADD MAN DIFF?: NO
ANION GAP: 13 (ref 8–16)
BASOPHIL #: 0 10^3/UL (ref 0–0.1)
BASOPHILS %: 0.8 % (ref 0–2)
BLOOD UREA NITROGEN: 13 MG/DL (ref 7–20)
CALCIUM: 9.5 MG/DL (ref 8.4–10.2)
CARBON DIOXIDE: 32 MMOL/L (ref 21–31)
CHLORIDE: 100 MMOL/L (ref 97–110)
CREATININE: 0.98 MG/DL (ref 0.61–1.24)
EOSINOPHILS #: 0.2 10^3/UL (ref 0–0.5)
EOSINOPHILS %: 3 % (ref 0–7)
GLUCOSE: 118 MG/DL (ref 70–220)
HEMATOCRIT: 35.6 % (ref 42–52)
HEMOGLOBIN: 10.6 G/DL (ref 14–18)
LYMPHOCYTES #: 1.5 10^3/UL (ref 0.8–2.9)
LYMPHOCYTES %: 29.3 % (ref 15–51)
MAGNESIUM: 1.7 MG/DL (ref 1.7–2.5)
MEAN CORPUSCULAR HEMOGLOBIN: 21.8 PG (ref 29–33)
MEAN CORPUSCULAR HGB CONC: 29.8 G/DL (ref 32–37)
MEAN CORPUSCULAR VOLUME: 73.1 FL (ref 82–101)
MEAN PLATELET VOLUME: 10.6 FL (ref 7.4–10.4)
MONOCYTE #: 0.5 10^3/UL (ref 0.3–0.9)
MONOCYTES %: 9.1 % (ref 0–11)
NEUTROPHIL #: 2.9 10^3/UL (ref 1.6–7.5)
NEUTROPHILS %: 57.6 % (ref 39–77)
NUCLEATED RED BLOOD CELLS #: 0 10^3/UL (ref 0–0)
NUCLEATED RED BLOOD CELLS%: 0 /100WBC (ref 0–0)
PHOSPHORUS: 5.2 MG/DL (ref 2.5–4.9)
PLATELET COUNT: 272 10^3/UL (ref 140–415)
POSITIVE DIFF: (no result)
POTASSIUM: 4.3 MMOL/L (ref 3.5–5.1)
RED BLOOD COUNT: 4.87 10^6/UL (ref 4.7–6.1)
RED CELL DISTRIBUTION WIDTH: 24.4 % (ref 11.5–14.5)
SODIUM: 141 MMOL/L (ref 135–144)
WHITE BLOOD COUNT: 5 10^3/UL (ref 4.8–10.8)

## 2018-02-08 RX ADMIN — ZOLPIDEM TARTRATE 1 MG: 5 TABLET, FILM COATED ORAL at 22:58

## 2018-02-08 RX ADMIN — METOPROLOL TARTRATE 1 MG: 25 TABLET ORAL at 20:52

## 2018-02-08 RX ADMIN — OXYCODONE HYDROCHLORIDE 1 MG: 40 TABLET, FILM COATED, EXTENDED RELEASE ORAL at 09:35

## 2018-02-08 RX ADMIN — CYANOCOBALAMIN TAB 500 MCG 1 MCG: 500 TAB at 10:37

## 2018-02-08 RX ADMIN — GABAPENTIN 1 MG: 400 CAPSULE ORAL at 23:28

## 2018-02-08 RX ADMIN — GABAPENTIN 1 MG: 400 CAPSULE ORAL at 05:47

## 2018-02-08 RX ADMIN — OXYCODONE HYDROCHLORIDE 1 MG: 40 TABLET, FILM COATED, EXTENDED RELEASE ORAL at 17:57

## 2018-02-08 RX ADMIN — METOPROLOL TARTRATE 1 MG: 25 TABLET ORAL at 09:25

## 2018-02-08 RX ADMIN — Medication 1 TAB: at 20:39

## 2018-02-08 RX ADMIN — MAGNESIUM SULFATE HEPTAHYDRATE 1 MLS/HR: 40 INJECTION, SOLUTION INTRAVENOUS at 09:36

## 2018-02-08 RX ADMIN — HYDROMORPHONE HYDROCHLORIDE 1 MG: 2 TABLET ORAL at 02:13

## 2018-02-08 RX ADMIN — TAMSULOSIN HYDROCHLORIDE 1 MG: 0.4 CAPSULE ORAL at 20:39

## 2018-02-08 RX ADMIN — POLYETHYLENE GLYCOL 3350 1 GM: 17 POWDER, FOR SOLUTION ORAL at 09:26

## 2018-02-08 RX ADMIN — Medication 1 TAB: at 09:24

## 2018-02-08 RX ADMIN — SENNOSIDES 1 TAB: 8.6 TABLET, FILM COATED ORAL at 20:39

## 2018-02-08 RX ADMIN — APIXABAN 1 MG: 5 TABLET, FILM COATED ORAL at 20:39

## 2018-02-08 RX ADMIN — HYDROMORPHONE HYDROCHLORIDE 1 MG: 2 TABLET ORAL at 06:10

## 2018-02-08 RX ADMIN — VANCOMYCIN HYDROCHLORIDE 1 MLS/HR: 1 INJECTION, POWDER, LYOPHILIZED, FOR SOLUTION INTRAVENOUS at 05:47

## 2018-02-08 RX ADMIN — GABAPENTIN 1 MG: 400 CAPSULE ORAL at 17:59

## 2018-02-08 RX ADMIN — BACLOFEN 1 MG: 10 TABLET ORAL at 09:24

## 2018-02-08 RX ADMIN — HYDROMORPHONE HYDROCHLORIDE 1 MG: 2 TABLET ORAL at 11:11

## 2018-02-08 RX ADMIN — BACLOFEN 1 MG: 10 TABLET ORAL at 20:39

## 2018-02-08 RX ADMIN — OXYCODONE HYDROCHLORIDE 1 MG: 40 TABLET, FILM COATED, EXTENDED RELEASE ORAL at 13:32

## 2018-02-08 RX ADMIN — HYDROMORPHONE HYDROCHLORIDE 1 MG: 2 TABLET ORAL at 15:12

## 2018-02-08 RX ADMIN — DOCUSATE SODIUM 1 MG: 100 CAPSULE, LIQUID FILLED ORAL at 09:24

## 2018-02-08 RX ADMIN — AMLODIPINE BESYLATE 1 MG: 2.5 TABLET ORAL at 20:52

## 2018-02-08 RX ADMIN — VANCOMYCIN HYDROCHLORIDE 1 MLS/HR: 1 INJECTION, POWDER, LYOPHILIZED, FOR SOLUTION INTRAVENOUS at 18:00

## 2018-02-08 RX ADMIN — AMLODIPINE BESYLATE 1 MG: 2.5 TABLET ORAL at 09:25

## 2018-02-08 RX ADMIN — DOCUSATE SODIUM 1 MG: 100 CAPSULE, LIQUID FILLED ORAL at 20:39

## 2018-02-08 RX ADMIN — APIXABAN 1 MG: 5 TABLET, FILM COATED ORAL at 09:24

## 2018-02-08 RX ADMIN — GABAPENTIN 1 MG: 400 CAPSULE ORAL at 11:48

## 2018-02-08 RX ADMIN — HYDROMORPHONE HYDROCHLORIDE 1 MG: 2 TABLET ORAL at 20:38

## 2018-02-09 RX ADMIN — DOCUSATE SODIUM 1 MG: 100 CAPSULE, LIQUID FILLED ORAL at 21:06

## 2018-02-09 RX ADMIN — HYDROMORPHONE HYDROCHLORIDE 1 MG: 2 TABLET ORAL at 12:58

## 2018-02-09 RX ADMIN — BACLOFEN 1 MG: 10 TABLET ORAL at 09:07

## 2018-02-09 RX ADMIN — AMLODIPINE BESYLATE 1 MG: 2.5 TABLET ORAL at 21:00

## 2018-02-09 RX ADMIN — BACLOFEN 1 MG: 10 TABLET ORAL at 21:05

## 2018-02-09 RX ADMIN — HYDROMORPHONE HYDROCHLORIDE 1 MG: 2 TABLET ORAL at 09:08

## 2018-02-09 RX ADMIN — METOPROLOL TARTRATE 1 MG: 25 TABLET ORAL at 09:07

## 2018-02-09 RX ADMIN — SENNOSIDES 1 TAB: 8.6 TABLET, FILM COATED ORAL at 21:00

## 2018-02-09 RX ADMIN — GABAPENTIN 1 MG: 400 CAPSULE ORAL at 05:34

## 2018-02-09 RX ADMIN — VANCOMYCIN HYDROCHLORIDE 1 MLS/HR: 1 INJECTION, POWDER, LYOPHILIZED, FOR SOLUTION INTRAVENOUS at 18:08

## 2018-02-09 RX ADMIN — PANTOPRAZOLE SODIUM 1 MG: 40 TABLET, DELAYED RELEASE ORAL at 12:15

## 2018-02-09 RX ADMIN — VANCOMYCIN HYDROCHLORIDE 1 MLS/HR: 1 INJECTION, POWDER, LYOPHILIZED, FOR SOLUTION INTRAVENOUS at 05:34

## 2018-02-09 RX ADMIN — GABAPENTIN 1 MG: 400 CAPSULE ORAL at 23:21

## 2018-02-09 RX ADMIN — OXYCODONE HYDROCHLORIDE 1 MG: 40 TABLET, FILM COATED, EXTENDED RELEASE ORAL at 12:15

## 2018-02-09 RX ADMIN — METOPROLOL TARTRATE 1 MG: 25 TABLET ORAL at 21:06

## 2018-02-09 RX ADMIN — HYDROMORPHONE HYDROCHLORIDE 1 MG: 2 TABLET ORAL at 00:38

## 2018-02-09 RX ADMIN — OXYCODONE HYDROCHLORIDE 1 MG: 40 TABLET, FILM COATED, EXTENDED RELEASE ORAL at 18:08

## 2018-02-09 RX ADMIN — TAMSULOSIN HYDROCHLORIDE 1 MG: 0.4 CAPSULE ORAL at 21:06

## 2018-02-09 RX ADMIN — Medication 1 TAB: at 21:06

## 2018-02-09 RX ADMIN — GABAPENTIN 1 MG: 400 CAPSULE ORAL at 17:23

## 2018-02-09 RX ADMIN — HYDROMORPHONE HYDROCHLORIDE 1 MG: 2 TABLET ORAL at 21:35

## 2018-02-09 RX ADMIN — ZOLPIDEM TARTRATE 1 MG: 5 TABLET, FILM COATED ORAL at 22:42

## 2018-02-09 RX ADMIN — APIXABAN 1 MG: 5 TABLET, FILM COATED ORAL at 21:07

## 2018-02-09 RX ADMIN — DOCUSATE SODIUM 1 MG: 100 CAPSULE, LIQUID FILLED ORAL at 09:06

## 2018-02-09 RX ADMIN — APIXABAN 1 MG: 5 TABLET, FILM COATED ORAL at 09:07

## 2018-02-09 RX ADMIN — ZOLPIDEM TARTRATE 1 MG: 5 TABLET, FILM COATED ORAL at 01:23

## 2018-02-09 RX ADMIN — HYDROMORPHONE HYDROCHLORIDE 1 MG: 2 TABLET ORAL at 05:34

## 2018-02-09 RX ADMIN — Medication 1 TAB: at 09:06

## 2018-02-09 RX ADMIN — OXYCODONE HYDROCHLORIDE 1 MG: 40 TABLET, FILM COATED, EXTENDED RELEASE ORAL at 09:15

## 2018-02-09 RX ADMIN — AMLODIPINE BESYLATE 1 MG: 2.5 TABLET ORAL at 09:07

## 2018-02-09 RX ADMIN — CYANOCOBALAMIN TAB 500 MCG 1 MCG: 500 TAB at 09:00

## 2018-02-09 RX ADMIN — HYDROMORPHONE HYDROCHLORIDE 1 MG: 2 TABLET ORAL at 17:23

## 2018-02-09 RX ADMIN — GABAPENTIN 1 MG: 400 CAPSULE ORAL at 12:15

## 2018-02-09 RX ADMIN — POLYETHYLENE GLYCOL 3350 1 GM: 17 POWDER, FOR SOLUTION ORAL at 09:08

## 2018-02-09 RX ADMIN — ZOLPIDEM TARTRATE 1 MG: 5 TABLET, FILM COATED ORAL at 21:35

## 2018-02-10 LAB
BLOOD UREA NITROGEN: 16 MG/DL (ref 7–20)
CREATININE: 0.96 MG/DL (ref 0.61–1.24)
VANCOMYCIN,TROUGH: 17.2 UG/ML (ref 10–20)

## 2018-02-10 RX ADMIN — CYANOCOBALAMIN TAB 500 MCG 1 MCG: 500 TAB at 12:33

## 2018-02-10 RX ADMIN — Medication 1 TAB: at 20:45

## 2018-02-10 RX ADMIN — HYDROMORPHONE HYDROCHLORIDE 1 MG: 2 TABLET ORAL at 02:08

## 2018-02-10 RX ADMIN — ZOLPIDEM TARTRATE 1 MG: 5 TABLET, FILM COATED ORAL at 23:13

## 2018-02-10 RX ADMIN — VANCOMYCIN HYDROCHLORIDE 1 MLS/HR: 1 INJECTION, POWDER, LYOPHILIZED, FOR SOLUTION INTRAVENOUS at 06:25

## 2018-02-10 RX ADMIN — Medication 1 TAB: at 10:02

## 2018-02-10 RX ADMIN — VANCOMYCIN HYDROCHLORIDE 1 MLS/HR: 1 INJECTION, POWDER, LYOPHILIZED, FOR SOLUTION INTRAVENOUS at 18:51

## 2018-02-10 RX ADMIN — DOCUSATE SODIUM 1 MG: 100 CAPSULE, LIQUID FILLED ORAL at 20:44

## 2018-02-10 RX ADMIN — GABAPENTIN 1 MG: 400 CAPSULE ORAL at 18:49

## 2018-02-10 RX ADMIN — APIXABAN 1 MG: 5 TABLET, FILM COATED ORAL at 10:03

## 2018-02-10 RX ADMIN — HYDROMORPHONE HYDROCHLORIDE 1 MG: 2 TABLET ORAL at 18:50

## 2018-02-10 RX ADMIN — GABAPENTIN 1 MG: 400 CAPSULE ORAL at 06:25

## 2018-02-10 RX ADMIN — METOPROLOL TARTRATE 1 MG: 25 TABLET ORAL at 20:47

## 2018-02-10 RX ADMIN — GABAPENTIN 1 MG: 400 CAPSULE ORAL at 12:33

## 2018-02-10 RX ADMIN — AMLODIPINE BESYLATE 1 MG: 2.5 TABLET ORAL at 10:03

## 2018-02-10 RX ADMIN — OXYCODONE HYDROCHLORIDE 1 MG: 40 TABLET, FILM COATED, EXTENDED RELEASE ORAL at 20:45

## 2018-02-10 RX ADMIN — BACLOFEN 1 MG: 10 TABLET ORAL at 20:45

## 2018-02-10 RX ADMIN — HYDROMORPHONE HYDROCHLORIDE 1 MG: 2 TABLET ORAL at 12:33

## 2018-02-10 RX ADMIN — BACLOFEN 1 MG: 10 TABLET ORAL at 10:02

## 2018-02-10 RX ADMIN — OXYCODONE HYDROCHLORIDE 1 MG: 40 TABLET, FILM COATED, EXTENDED RELEASE ORAL at 10:02

## 2018-02-10 RX ADMIN — APIXABAN 1 MG: 5 TABLET, FILM COATED ORAL at 20:45

## 2018-02-10 RX ADMIN — GABAPENTIN 1 MG: 400 CAPSULE ORAL at 23:13

## 2018-02-10 RX ADMIN — TAMSULOSIN HYDROCHLORIDE 1 MG: 0.4 CAPSULE ORAL at 20:46

## 2018-02-10 RX ADMIN — POLYETHYLENE GLYCOL 3350 1 GM: 17 POWDER, FOR SOLUTION ORAL at 09:00

## 2018-02-10 RX ADMIN — SENNOSIDES 1 TAB: 8.6 TABLET, FILM COATED ORAL at 20:45

## 2018-02-10 RX ADMIN — POLYETHYLENE GLYCOL 3350 1 GM: 17 POWDER, FOR SOLUTION ORAL at 10:04

## 2018-02-10 RX ADMIN — OXYCODONE HYDROCHLORIDE 1 MG: 40 TABLET, FILM COATED, EXTENDED RELEASE ORAL at 13:26

## 2018-02-10 RX ADMIN — DOCUSATE SODIUM 1 MG: 100 CAPSULE, LIQUID FILLED ORAL at 10:02

## 2018-02-10 RX ADMIN — HYDROMORPHONE HYDROCHLORIDE 1 MG: 2 TABLET ORAL at 06:26

## 2018-02-10 RX ADMIN — AMLODIPINE BESYLATE 1 MG: 2.5 TABLET ORAL at 20:46

## 2018-02-10 RX ADMIN — HYDROMORPHONE HYDROCHLORIDE 1 MG: 2 TABLET ORAL at 23:13

## 2018-02-10 RX ADMIN — METOPROLOL TARTRATE 1 MG: 25 TABLET ORAL at 10:03

## 2018-02-11 RX ADMIN — METOPROLOL TARTRATE 1 MG: 25 TABLET ORAL at 10:16

## 2018-02-11 RX ADMIN — AMLODIPINE BESYLATE 1 MG: 2.5 TABLET ORAL at 10:16

## 2018-02-11 RX ADMIN — Medication 1 TAB: at 10:09

## 2018-02-11 RX ADMIN — TAMSULOSIN HYDROCHLORIDE 1 MG: 0.4 CAPSULE ORAL at 20:30

## 2018-02-11 RX ADMIN — APIXABAN 1 MG: 5 TABLET, FILM COATED ORAL at 20:30

## 2018-02-11 RX ADMIN — HYDROMORPHONE HYDROCHLORIDE 1 MG: 2 TABLET ORAL at 16:23

## 2018-02-11 RX ADMIN — BACLOFEN 1 MG: 10 TABLET ORAL at 10:09

## 2018-02-11 RX ADMIN — METOPROLOL TARTRATE 1 MG: 25 TABLET ORAL at 20:31

## 2018-02-11 RX ADMIN — HYDROMORPHONE HYDROCHLORIDE 1 MG: 2 TABLET ORAL at 07:09

## 2018-02-11 RX ADMIN — VANCOMYCIN HYDROCHLORIDE 1 MLS/HR: 1 INJECTION, POWDER, LYOPHILIZED, FOR SOLUTION INTRAVENOUS at 19:37

## 2018-02-11 RX ADMIN — GABAPENTIN 1 MG: 400 CAPSULE ORAL at 06:10

## 2018-02-11 RX ADMIN — DOCUSATE SODIUM 1 MG: 100 CAPSULE, LIQUID FILLED ORAL at 20:30

## 2018-02-11 RX ADMIN — ZOLPIDEM TARTRATE 1 MG: 5 TABLET, FILM COATED ORAL at 23:05

## 2018-02-11 RX ADMIN — APIXABAN 1 MG: 5 TABLET, FILM COATED ORAL at 10:09

## 2018-02-11 RX ADMIN — SENNOSIDES 1 TAB: 8.6 TABLET, FILM COATED ORAL at 20:30

## 2018-02-11 RX ADMIN — ZOLPIDEM TARTRATE 1 MG: 5 TABLET, FILM COATED ORAL at 03:06

## 2018-02-11 RX ADMIN — GABAPENTIN 1 MG: 400 CAPSULE ORAL at 18:10

## 2018-02-11 RX ADMIN — Medication 1 TAB: at 20:30

## 2018-02-11 RX ADMIN — BACLOFEN 1 MG: 10 TABLET ORAL at 20:30

## 2018-02-11 RX ADMIN — DOCUSATE SODIUM 1 MG: 100 CAPSULE, LIQUID FILLED ORAL at 10:09

## 2018-02-11 RX ADMIN — OXYCODONE HYDROCHLORIDE 1 MG: 40 TABLET, FILM COATED, EXTENDED RELEASE ORAL at 14:39

## 2018-02-11 RX ADMIN — HYDROMORPHONE HYDROCHLORIDE 1 MG: 2 TABLET ORAL at 03:03

## 2018-02-11 RX ADMIN — OXYCODONE HYDROCHLORIDE 1 MG: 40 TABLET, FILM COATED, EXTENDED RELEASE ORAL at 10:10

## 2018-02-11 RX ADMIN — HYDROMORPHONE HYDROCHLORIDE 1 MG: 2 TABLET ORAL at 20:31

## 2018-02-11 RX ADMIN — VANCOMYCIN HYDROCHLORIDE 1 MLS/HR: 1 INJECTION, POWDER, LYOPHILIZED, FOR SOLUTION INTRAVENOUS at 06:10

## 2018-02-11 RX ADMIN — HYDROMORPHONE HYDROCHLORIDE 1 MG: 2 TABLET ORAL at 12:03

## 2018-02-11 RX ADMIN — AMLODIPINE BESYLATE 1 MG: 2.5 TABLET ORAL at 20:31

## 2018-02-11 RX ADMIN — GABAPENTIN 1 MG: 400 CAPSULE ORAL at 12:03

## 2018-02-11 RX ADMIN — OXYCODONE HYDROCHLORIDE 1 MG: 40 TABLET, FILM COATED, EXTENDED RELEASE ORAL at 18:08

## 2018-02-11 RX ADMIN — CYANOCOBALAMIN TAB 500 MCG 1 MCG: 500 TAB at 10:10

## 2018-02-11 RX ADMIN — POLYETHYLENE GLYCOL 3350 1 GM: 17 POWDER, FOR SOLUTION ORAL at 09:00

## 2018-02-12 LAB — VANCOMYCIN,TROUGH: 16.2 UG/ML (ref 10–20)

## 2018-02-12 RX ADMIN — OXYCODONE HYDROCHLORIDE 1 MG: 40 TABLET, FILM COATED, EXTENDED RELEASE ORAL at 14:08

## 2018-02-12 RX ADMIN — HYDROMORPHONE HYDROCHLORIDE 1 MG: 2 TABLET ORAL at 03:20

## 2018-02-12 RX ADMIN — ZOLPIDEM TARTRATE 1 MG: 5 TABLET, FILM COATED ORAL at 23:49

## 2018-02-12 RX ADMIN — BACLOFEN 1 MG: 10 TABLET ORAL at 08:42

## 2018-02-12 RX ADMIN — TAMSULOSIN HYDROCHLORIDE 1 MG: 0.4 CAPSULE ORAL at 20:07

## 2018-02-12 RX ADMIN — Medication 1 TAB: at 08:39

## 2018-02-12 RX ADMIN — AMLODIPINE BESYLATE 1 MG: 2.5 TABLET ORAL at 20:08

## 2018-02-12 RX ADMIN — POLYETHYLENE GLYCOL 3350 1 GM: 17 POWDER, FOR SOLUTION ORAL at 08:42

## 2018-02-12 RX ADMIN — APIXABAN 1 MG: 5 TABLET, FILM COATED ORAL at 20:07

## 2018-02-12 RX ADMIN — METOPROLOL TARTRATE 1 MG: 25 TABLET ORAL at 20:09

## 2018-02-12 RX ADMIN — Medication 1 TAB: at 20:07

## 2018-02-12 RX ADMIN — GABAPENTIN 1 MG: 400 CAPSULE ORAL at 00:00

## 2018-02-12 RX ADMIN — DOCUSATE SODIUM 1 MG: 100 CAPSULE, LIQUID FILLED ORAL at 20:07

## 2018-02-12 RX ADMIN — CYANOCOBALAMIN TAB 500 MCG 1 MCG: 500 TAB at 08:42

## 2018-02-12 RX ADMIN — VANCOMYCIN HYDROCHLORIDE 1 MLS/HR: 1 INJECTION, POWDER, LYOPHILIZED, FOR SOLUTION INTRAVENOUS at 06:40

## 2018-02-12 RX ADMIN — OXYCODONE HYDROCHLORIDE 1 MG: 40 TABLET, FILM COATED, EXTENDED RELEASE ORAL at 08:40

## 2018-02-12 RX ADMIN — SENNOSIDES 1 TAB: 8.6 TABLET, FILM COATED ORAL at 20:07

## 2018-02-12 RX ADMIN — HYDROMORPHONE HYDROCHLORIDE 1 MG: 2 TABLET ORAL at 07:10

## 2018-02-12 RX ADMIN — APIXABAN 1 MG: 5 TABLET, FILM COATED ORAL at 08:40

## 2018-02-12 RX ADMIN — HYDROMORPHONE HYDROCHLORIDE 1 MG: 2 TABLET ORAL at 12:20

## 2018-02-12 RX ADMIN — ZOLPIDEM TARTRATE 1 MG: 5 TABLET, FILM COATED ORAL at 21:43

## 2018-02-12 RX ADMIN — AMLODIPINE BESYLATE 1 MG: 2.5 TABLET ORAL at 08:42

## 2018-02-12 RX ADMIN — GABAPENTIN 1 MG: 400 CAPSULE ORAL at 06:21

## 2018-02-12 RX ADMIN — GABAPENTIN 1 MG: 400 CAPSULE ORAL at 12:20

## 2018-02-12 RX ADMIN — BACLOFEN 1 MG: 10 TABLET ORAL at 20:07

## 2018-02-12 RX ADMIN — HYDROMORPHONE HYDROCHLORIDE 1 MG: 2 TABLET ORAL at 20:55

## 2018-02-12 RX ADMIN — OXYCODONE HYDROCHLORIDE 1 MG: 40 TABLET, FILM COATED, EXTENDED RELEASE ORAL at 17:25

## 2018-02-12 RX ADMIN — DOCUSATE SODIUM 1 MG: 100 CAPSULE, LIQUID FILLED ORAL at 08:42

## 2018-02-12 RX ADMIN — HYDROMORPHONE HYDROCHLORIDE 1 MG: 2 TABLET ORAL at 16:15

## 2018-02-12 RX ADMIN — GABAPENTIN 1 MG: 400 CAPSULE ORAL at 17:24

## 2018-02-12 RX ADMIN — METOPROLOL TARTRATE 1 MG: 25 TABLET ORAL at 08:41

## 2018-02-13 LAB
% IRON SATURATION: 20 % SAT (ref 22–52)
ABNORMAL IP MESSAGE: 1
ADD MAN DIFF?: NO
ANION GAP: 12 (ref 8–16)
BASOPHIL #: 0.1 10^3/UL (ref 0–0.1)
BASOPHILS %: 1.1 % (ref 0–2)
BLOOD UREA NITROGEN: 13 MG/DL (ref 7–20)
CALCIUM: 9.7 MG/DL (ref 8.4–10.2)
CARBON DIOXIDE: 27 MMOL/L (ref 21–31)
CHLORIDE: 105 MMOL/L (ref 97–110)
CREATININE: 0.86 MG/DL (ref 0.61–1.24)
EOSINOPHILS #: 0.1 10^3/UL (ref 0–0.5)
EOSINOPHILS %: 2.6 % (ref 0–7)
FERRITIN: 107 NG/ML (ref 11.1–264)
GLUCOSE: 108 MG/DL (ref 70–220)
HEMATOCRIT: 36.1 % (ref 42–52)
HEMOGLOBIN: 11.4 G/DL (ref 14–18)
IRON: 54 UG/DL (ref 35–150)
LYMPHOCYTES #: 1.3 10^3/UL (ref 0.8–2.9)
LYMPHOCYTES %: 27.4 % (ref 15–51)
MAGNESIUM: 1.6 MG/DL (ref 1.7–2.5)
MEAN CORPUSCULAR HEMOGLOBIN: 22.8 PG (ref 29–33)
MEAN CORPUSCULAR HGB CONC: 31.6 G/DL (ref 32–37)
MEAN CORPUSCULAR VOLUME: 72.2 FL (ref 82–101)
MEAN PLATELET VOLUME: 10.4 FL (ref 7.4–10.4)
MONOCYTE #: 0.3 10^3/UL (ref 0.3–0.9)
MONOCYTES %: 6.8 % (ref 0–11)
NEUTROPHIL #: 2.9 10^3/UL (ref 1.6–7.5)
NEUTROPHILS %: 61.9 % (ref 39–77)
NUCLEATED RED BLOOD CELLS #: 0 10^3/UL (ref 0–0)
NUCLEATED RED BLOOD CELLS%: 0 /100WBC (ref 0–0)
PHOSPHORUS: 4.9 MG/DL (ref 2.5–4.9)
PLATELET COUNT: 274 10^3/UL (ref 140–415)
POSITIVE DIFF: (no result)
POTASSIUM: 4.2 MMOL/L (ref 3.5–5.1)
RED BLOOD COUNT: 5 10^6/UL (ref 4.7–6.1)
RED CELL DISTRIBUTION WIDTH: 24.6 % (ref 11.5–14.5)
SODIUM: 140 MMOL/L (ref 135–144)
TOTAL IRON BINDING CAPACITY: 276 UG/DL (ref 241–421)
WHITE BLOOD COUNT: 4.7 10^3/UL (ref 4.8–10.8)

## 2018-02-13 RX ADMIN — AMLODIPINE BESYLATE 1 MG: 2.5 TABLET ORAL at 08:39

## 2018-02-13 RX ADMIN — MAGNESIUM OXIDE TAB 400 MG (241.3 MG ELEMENTAL MG) 1 MG: 400 (241.3 MG) TAB at 20:12

## 2018-02-13 RX ADMIN — APIXABAN 1 MG: 5 TABLET, FILM COATED ORAL at 08:39

## 2018-02-13 RX ADMIN — HYDROMORPHONE HYDROCHLORIDE 1 MG: 2 TABLET ORAL at 19:51

## 2018-02-13 RX ADMIN — METOPROLOL TARTRATE 1 MG: 25 TABLET ORAL at 08:39

## 2018-02-13 RX ADMIN — HYDROMORPHONE HYDROCHLORIDE 1 MG: 2 TABLET ORAL at 05:51

## 2018-02-13 RX ADMIN — GABAPENTIN 1 MG: 400 CAPSULE ORAL at 00:00

## 2018-02-13 RX ADMIN — BACLOFEN 1 MG: 10 TABLET ORAL at 19:51

## 2018-02-13 RX ADMIN — DOCUSATE SODIUM 1 MG: 100 CAPSULE, LIQUID FILLED ORAL at 08:39

## 2018-02-13 RX ADMIN — AMLODIPINE BESYLATE 1 MG: 2.5 TABLET ORAL at 19:52

## 2018-02-13 RX ADMIN — METOPROLOL TARTRATE 1 MG: 25 TABLET ORAL at 19:52

## 2018-02-13 RX ADMIN — GABAPENTIN 1 MG: 400 CAPSULE ORAL at 17:54

## 2018-02-13 RX ADMIN — OXYCODONE HYDROCHLORIDE 1 MG: 40 TABLET, FILM COATED, EXTENDED RELEASE ORAL at 17:54

## 2018-02-13 RX ADMIN — HYDROMORPHONE HYDROCHLORIDE 1 MG: 2 TABLET ORAL at 15:39

## 2018-02-13 RX ADMIN — OXYCODONE HYDROCHLORIDE 1 MG: 40 TABLET, FILM COATED, EXTENDED RELEASE ORAL at 08:38

## 2018-02-13 RX ADMIN — PANTOPRAZOLE SODIUM 1 MG: 40 TABLET, DELAYED RELEASE ORAL at 17:54

## 2018-02-13 RX ADMIN — GABAPENTIN 1 MG: 400 CAPSULE ORAL at 05:50

## 2018-02-13 RX ADMIN — ZOLPIDEM TARTRATE 1 MG: 5 TABLET, FILM COATED ORAL at 22:32

## 2018-02-13 RX ADMIN — BACLOFEN 1 MG: 10 TABLET ORAL at 08:39

## 2018-02-13 RX ADMIN — CYANOCOBALAMIN TAB 500 MCG 1 MCG: 500 TAB at 08:38

## 2018-02-13 RX ADMIN — OXYCODONE HYDROCHLORIDE 1 MG: 40 TABLET, FILM COATED, EXTENDED RELEASE ORAL at 13:10

## 2018-02-13 RX ADMIN — Medication 1 TAB: at 08:38

## 2018-02-13 RX ADMIN — ERGOCALCIFEROL 1 UNIT: 1.25 CAPSULE ORAL at 20:12

## 2018-02-13 RX ADMIN — GABAPENTIN 1 MG: 400 CAPSULE ORAL at 13:10

## 2018-02-13 RX ADMIN — APIXABAN 1 MG: 5 TABLET, FILM COATED ORAL at 19:51

## 2018-02-13 RX ADMIN — POLYETHYLENE GLYCOL 3350 1 GM: 17 POWDER, FOR SOLUTION ORAL at 08:38

## 2018-02-13 RX ADMIN — TAMSULOSIN HYDROCHLORIDE 1 MG: 0.4 CAPSULE ORAL at 19:50

## 2018-02-13 RX ADMIN — SENNOSIDES 1 TAB: 8.6 TABLET, FILM COATED ORAL at 19:51

## 2018-02-13 RX ADMIN — Medication 1 TAB: at 19:51

## 2018-02-13 RX ADMIN — HYDROMORPHONE HYDROCHLORIDE 1 MG: 2 TABLET ORAL at 11:02

## 2018-02-13 RX ADMIN — DOCUSATE SODIUM 1 MG: 100 CAPSULE, LIQUID FILLED ORAL at 19:51

## 2018-02-13 RX ADMIN — MAGNESIUM OXIDE TAB 400 MG (241.3 MG ELEMENTAL MG) 1 MG: 400 (241.3 MG) TAB at 08:39

## 2018-02-13 RX ADMIN — ZOLPIDEM TARTRATE 1 MG: 5 TABLET, FILM COATED ORAL at 23:28

## 2018-02-14 RX ADMIN — APIXABAN 1 MG: 5 TABLET, FILM COATED ORAL at 08:58

## 2018-02-14 RX ADMIN — Medication 1 TAB: at 08:58

## 2018-02-14 RX ADMIN — HYDROMORPHONE HYDROCHLORIDE 1 MG: 2 TABLET ORAL at 00:09

## 2018-02-14 RX ADMIN — HYDROMORPHONE HYDROCHLORIDE 1 MG: 2 TABLET ORAL at 12:59

## 2018-02-14 RX ADMIN — METOPROLOL TARTRATE 1 MG: 25 TABLET ORAL at 08:58

## 2018-02-14 RX ADMIN — OXYCODONE HYDROCHLORIDE 1 MG: 40 TABLET, FILM COATED, EXTENDED RELEASE ORAL at 10:22

## 2018-02-14 RX ADMIN — CYANOCOBALAMIN TAB 500 MCG 1 MCG: 500 TAB at 10:22

## 2018-02-14 RX ADMIN — BACLOFEN 1 MG: 10 TABLET ORAL at 08:58

## 2018-02-14 RX ADMIN — HYDROMORPHONE HYDROCHLORIDE 1 MG: 2 TABLET ORAL at 08:56

## 2018-02-14 RX ADMIN — GABAPENTIN 1 MG: 400 CAPSULE ORAL at 00:00

## 2018-02-14 RX ADMIN — AMLODIPINE BESYLATE 1 MG: 2.5 TABLET ORAL at 08:57

## 2018-02-14 RX ADMIN — GABAPENTIN 1 MG: 400 CAPSULE ORAL at 06:13

## 2018-02-14 RX ADMIN — HYDROMORPHONE HYDROCHLORIDE 1 MG: 2 TABLET ORAL at 04:20

## 2018-02-14 RX ADMIN — MAGNESIUM OXIDE TAB 400 MG (241.3 MG ELEMENTAL MG) 1 MG: 400 (241.3 MG) TAB at 08:58

## 2018-02-14 RX ADMIN — DOCUSATE SODIUM 1 MG: 100 CAPSULE, LIQUID FILLED ORAL at 08:56

## 2018-02-14 RX ADMIN — GABAPENTIN 1 MG: 400 CAPSULE ORAL at 12:59

## 2018-02-14 RX ADMIN — POLYETHYLENE GLYCOL 3350 1 GM: 17 POWDER, FOR SOLUTION ORAL at 08:57

## 2018-02-14 RX ADMIN — PANTOPRAZOLE SODIUM 1 MG: 40 TABLET, DELAYED RELEASE ORAL at 06:13

## 2021-09-01 ENCOUNTER — NEW PATIENT (OUTPATIENT)
Dept: URBAN - METROPOLITAN AREA CLINIC 21 | Facility: CLINIC | Age: 66
End: 2021-09-01

## 2021-09-01 DIAGNOSIS — H35.3130: ICD-10-CM

## 2021-09-01 DIAGNOSIS — H25.9: ICD-10-CM

## 2021-09-01 DIAGNOSIS — H04.123: ICD-10-CM

## 2021-09-01 PROCEDURE — 92134 CPTRZ OPH DX IMG PST SGM RTA: CPT

## 2021-09-01 PROCEDURE — 92020 GONIOSCOPY: CPT

## 2021-09-01 PROCEDURE — 92004 COMPRE OPH EXAM NEW PT 1/>: CPT

## 2021-09-01 ASSESSMENT — KERATOMETRY
OD_AXISANGLE2_DEGREES: 114
OD_AXISANGLE_DEGREES: 24
OD_K1POWER_DIOPTERS: 42.50
OS_AXISANGLE_DEGREES: 6
OS_K1POWER_DIOPTERS: 42.00
OD_K2POWER_DIOPTERS: 43.00
OS_AXISANGLE2_DEGREES: 96
OS_K2POWER_DIOPTERS: 43.50

## 2021-09-01 ASSESSMENT — TONOMETRY
OD_IOP_MMHG: 12
OS_IOP_MMHG: 12

## 2021-09-01 ASSESSMENT — VISUAL ACUITY
OD_CC: 20/25-3
OS_CC: 20/30

## 2022-08-01 ENCOUNTER — ESTABLISHED COMPREHENSIVE EXAM (OUTPATIENT)
Dept: URBAN - METROPOLITAN AREA CLINIC 21 | Facility: CLINIC | Age: 67
End: 2022-08-01

## 2022-08-01 DIAGNOSIS — H25.813: ICD-10-CM

## 2022-08-01 DIAGNOSIS — H35.3130: ICD-10-CM

## 2022-08-01 DIAGNOSIS — H04.123: ICD-10-CM

## 2022-08-01 DIAGNOSIS — H52.4: ICD-10-CM

## 2022-08-01 PROCEDURE — 92134 CPTRZ OPH DX IMG PST SGM RTA: CPT

## 2022-08-01 PROCEDURE — 92015 DETERMINE REFRACTIVE STATE: CPT

## 2022-08-01 PROCEDURE — 92014 COMPRE OPH EXAM EST PT 1/>: CPT

## 2022-08-01 ASSESSMENT — KERATOMETRY
OD_K1POWER_DIOPTERS: 42.50
OS_K1POWER_DIOPTERS: 42.00
OS_K2POWER_DIOPTERS: 43.50
OS_AXISANGLE_DEGREES: 6
OD_AXISANGLE2_DEGREES: 114
OD_AXISANGLE_DEGREES: 24
OS_AXISANGLE2_DEGREES: 96
OD_K2POWER_DIOPTERS: 43.00

## 2022-08-01 ASSESSMENT — VISUAL ACUITY
OS_CC: 20/30+2
OD_CC: 20/25-2
OU_CC: J1

## 2022-08-01 ASSESSMENT — TONOMETRY
OS_IOP_MMHG: 10
OD_IOP_MMHG: 10

## 2024-02-28 ENCOUNTER — APPOINTMENT (RX ONLY)
Dept: URBAN - METROPOLITAN AREA CLINIC 94 | Facility: CLINIC | Age: 69
Setting detail: DERMATOLOGY
End: 2024-02-28

## 2024-02-28 DIAGNOSIS — L98.8 OTHER SPECIFIED DISORDERS OF THE SKIN AND SUBCUTANEOUS TISSUE: ICD-10-CM

## 2024-02-28 DIAGNOSIS — D22 MELANOCYTIC NEVI: ICD-10-CM

## 2024-02-28 DIAGNOSIS — L81.4 OTHER MELANIN HYPERPIGMENTATION: ICD-10-CM

## 2024-02-28 DIAGNOSIS — L82.1 OTHER SEBORRHEIC KERATOSIS: ICD-10-CM

## 2024-02-28 DIAGNOSIS — L21.8 OTHER SEBORRHEIC DERMATITIS: ICD-10-CM

## 2024-02-28 DIAGNOSIS — L85.3 XEROSIS CUTIS: ICD-10-CM

## 2024-02-28 PROBLEM — D22.61 MELANOCYTIC NEVI OF RIGHT UPPER LIMB, INCLUDING SHOULDER: Status: ACTIVE | Noted: 2024-02-28

## 2024-02-28 PROBLEM — D22.62 MELANOCYTIC NEVI OF LEFT UPPER LIMB, INCLUDING SHOULDER: Status: ACTIVE | Noted: 2024-02-28

## 2024-02-28 PROBLEM — D22.5 MELANOCYTIC NEVI OF TRUNK: Status: ACTIVE | Noted: 2024-02-28

## 2024-02-28 PROBLEM — D22.71 MELANOCYTIC NEVI OF RIGHT LOWER LIMB, INCLUDING HIP: Status: ACTIVE | Noted: 2024-02-28

## 2024-02-28 PROCEDURE — ? PRESCRIPTION

## 2024-02-28 PROCEDURE — ? COUNSELING

## 2024-02-28 PROCEDURE — 99203 OFFICE O/P NEW LOW 30 MIN: CPT

## 2024-02-28 RX ORDER — KETOCONAZOLE 20 MG/ML
SHAMPOO, SUSPENSION TOPICAL
Qty: 120 | Refills: 4 | Status: ERX | COMMUNITY
Start: 2024-02-28

## 2024-02-28 RX ADMIN — KETOCONAZOLE: 20 SHAMPOO, SUSPENSION TOPICAL at 00:00

## 2024-02-28 ASSESSMENT — LOCATION DETAILED DESCRIPTION DERM
LOCATION DETAILED: RIGHT DISTAL POSTERIOR UPPER ARM
LOCATION DETAILED: RIGHT DISTAL POSTERIOR THIGH
LOCATION DETAILED: INFERIOR THORACIC SPINE
LOCATION DETAILED: RIGHT PROXIMAL PRETIBIAL REGION
LOCATION DETAILED: RIGHT SUPERIOR MEDIAL UPPER BACK
LOCATION DETAILED: RIGHT OCCIPITAL SCALP
LOCATION DETAILED: LEFT DISTAL POSTERIOR UPPER ARM
LOCATION DETAILED: LEFT ANTERIOR PROXIMAL THIGH
LOCATION DETAILED: RIGHT ANTERIOR DISTAL UPPER ARM
LOCATION DETAILED: EPIGASTRIC SKIN
LOCATION DETAILED: RIGHT ANTERIOR PROXIMAL THIGH
LOCATION DETAILED: LEFT PROXIMAL POSTERIOR UPPER ARM
LOCATION DETAILED: RIGHT INFERIOR VERMILION LIP
LOCATION DETAILED: LEFT INFERIOR VERMILION LIP
LOCATION DETAILED: LEFT SUPERIOR PARIETAL SCALP
LOCATION DETAILED: LEFT PROXIMAL PRETIBIAL REGION
LOCATION DETAILED: LEFT ANTERIOR DISTAL UPPER ARM
LOCATION DETAILED: PERIUMBILICAL SKIN
LOCATION DETAILED: RIGHT PROXIMAL POSTERIOR UPPER ARM
LOCATION DETAILED: MID-OCCIPITAL SCALP
LOCATION DETAILED: LEFT INFERIOR MEDIAL UPPER BACK
LOCATION DETAILED: POSTERIOR MID-PARIETAL SCALP
LOCATION DETAILED: RIGHT ANTERIOR PROXIMAL UPPER ARM
LOCATION DETAILED: LEFT DISTAL POSTERIOR THIGH
LOCATION DETAILED: RIGHT ANTERIOR DISTAL THIGH

## 2024-02-28 ASSESSMENT — LOCATION SIMPLE DESCRIPTION DERM
LOCATION SIMPLE: LEFT THIGH
LOCATION SIMPLE: LEFT POSTERIOR THIGH
LOCATION SIMPLE: POSTERIOR SCALP
LOCATION SIMPLE: ABDOMEN
LOCATION SIMPLE: SCALP
LOCATION SIMPLE: RIGHT UPPER BACK
LOCATION SIMPLE: RIGHT THIGH
LOCATION SIMPLE: LEFT LIP
LOCATION SIMPLE: LEFT UPPER ARM
LOCATION SIMPLE: LEFT PRETIBIAL REGION
LOCATION SIMPLE: RIGHT UPPER ARM
LOCATION SIMPLE: LEFT UPPER BACK
LOCATION SIMPLE: RIGHT PRETIBIAL REGION
LOCATION SIMPLE: UPPER BACK
LOCATION SIMPLE: RIGHT LIP
LOCATION SIMPLE: RIGHT POSTERIOR THIGH

## 2024-02-28 ASSESSMENT — LOCATION ZONE DERM
LOCATION ZONE: ARM
LOCATION ZONE: TRUNK
LOCATION ZONE: LIP
LOCATION ZONE: SCALP
LOCATION ZONE: LEG

## 2024-02-28 NOTE — PROCEDURE: COUNSELING
Detail Level: Detailed
Detail Level: Simple
Patient Specific Counseling (Will Not Stick From Patient To Patient): may alternate with otc head and shoulders shampoo
Patient Specific Counseling (Will Not Stick From Patient To Patient): continue dove sensitive soap\\ncontinue ceravae cream, but use twice daily on body arms and legs